# Patient Record
Sex: MALE | Race: WHITE | NOT HISPANIC OR LATINO | Employment: OTHER | URBAN - METROPOLITAN AREA
[De-identification: names, ages, dates, MRNs, and addresses within clinical notes are randomized per-mention and may not be internally consistent; named-entity substitution may affect disease eponyms.]

---

## 2017-01-11 ENCOUNTER — GENERIC CONVERSION - ENCOUNTER (OUTPATIENT)
Dept: OTHER | Facility: OTHER | Age: 42
End: 2017-01-11

## 2017-02-01 ENCOUNTER — ALLSCRIPTS OFFICE VISIT (OUTPATIENT)
Dept: OTHER | Facility: OTHER | Age: 42
End: 2017-02-01

## 2017-06-29 ENCOUNTER — ALLSCRIPTS OFFICE VISIT (OUTPATIENT)
Dept: OTHER | Facility: OTHER | Age: 42
End: 2017-06-29

## 2018-01-10 NOTE — RESULT NOTES
Verified Results  (Q) BILIRUBIN, FRACTIONATED [id number] 12:00AM Dima Ashton     Test Name Result Flag Reference   BILIRUBIN, TOTAL 2 0 mg/dL H 0 2-1 2   BILIRUBIN, DIRECT 0 4 mg/dL H < OR = 0 2   BILIRUBIN, INDIRECT 1 6 mg/dL (calc) H 0 2-1 2     (Q) LYME DISEASE AB, TOTAL W/REFL WB (IGG, IGM) [id number] 12:00AM Dima Ashton     Test Name Result Flag Reference   LYME AB SCREEN < OR = 0 90 index     Index                 Interpretation                     -----                 --------------                     < or = 0 90           Negative                     0  91-1 09             Equivocal                     > or = 1 10           Positive     The use of purified VlsE-1 and PepC10 antigens in this  assay provides improved specificity compared to assays  that utilize whole cell lysates of B  burgdorferi, the  causative agent of Lyme disease, and slightly better  sensitivity compared to the C6 antibody assay  As recommended by the Food and Drug   Administration (FDA), all samples with positive or   equivocal results in a Borrelia burgdorferi antibody    EIA (screening) will be tested using a blot method  Positive or equivocal screening test results should not   be interpreted as truly positive until verified as such   using a supplemental assay (e g , B  burgdorferi blot)  The screening test and/or blot for B  burgdorferi   antibodies may be falsely negative in early stages of   Lyme disease, including the period when erythema   migrans is apparent         Plan  Abdominal pain, epigastric, Hyperbilirubinemia    · US RIGHT UPPER QUADRANT; Status:Hold For - Scheduling; Requested  for:[id number];

## 2018-01-11 NOTE — RESULT NOTES
Verified Results  US RIGHT UPPER QUADRANT [id number] 07:21PM Idalmis Terrell Order Number: [id number]    - Patient Instructions: To schedule this appointment, please contact Central Scheduling at [phone]  Test Name Result Flag Reference   US RIGHT UPPER QUADRANT (Report)     RIGHT UPPER QUADRANT ULTRASOUND     INDICATION: Elevated bilirubin  Abdominal pain  COMPARISON: None  TECHNIQUE:  Real-time ultrasound of the right upper quadrant was performed with a curvilinear transducer with both volumetric sweeps and still imaging techniques  FINDINGS:   PANCREAS: Visualized portions of the pancreas are within normal limits  AORTA AND IVC: Visualized portions are normal for patient age  LIVER:   Size: Within normal range  The liver measures 16 7 cm in the midclavicular line  Contour: Surface contour is smooth  Parenchyma: Echogenicity and echotexture are within normal limits  No evidence of suspicious mass  Limited imaging of the main portal vein shows it to be patent and hepatopedal  Waveform is within normal limits  BILIARY:   The gallbladder is normal in caliber  No wall thickening or pericholecystic fluid  No stones or sludge identified  2 separate rounded echogenic Intraluminal echogenic lesions measuring 3 mm each  No sonographic Murray's sign  No intrahepatic biliary dilatation  CBD measures 2 mm  No choledocholithiasis  KIDNEY:    Right kidney measures 12 2 x 5 4 cm  Partial column of Yassine  Within normal limits  ASCITES:  None  IMPRESSION:   Unremarkable exam      Small gallbladder polyps  According to current literature guidelines (JACR 2013;10:953-956) small polyps this size are benign and no workup or followup is needed  Workstation performed: [id number]     Signed by:    Mandy Diaz DO   12/14/16

## 2018-01-12 VITALS
TEMPERATURE: 98.4 F | BODY MASS INDEX: 25.74 KG/M2 | DIASTOLIC BLOOD PRESSURE: 70 MMHG | HEIGHT: 75 IN | WEIGHT: 207 LBS | SYSTOLIC BLOOD PRESSURE: 100 MMHG

## 2018-01-15 VITALS
HEIGHT: 75 IN | DIASTOLIC BLOOD PRESSURE: 70 MMHG | TEMPERATURE: 96.9 F | BODY MASS INDEX: 27.6 KG/M2 | SYSTOLIC BLOOD PRESSURE: 111 MMHG | WEIGHT: 222 LBS

## 2018-01-17 NOTE — RESULT NOTES
Message   Please tell Preston Regalado that his upper GI was negative and ask how he is doing with the Nexium  Verified Results  * FL UGI W/ AIR ROUTINE 27Oct2016 07:58AM Debi Doyle Order Number: [id number]    - Patient Instructions: To schedule this appointment, please contact Central Scheduling at [phone]  Test Name Result Flag Reference   FL UGI W/ AIR WO  (Report)     UPPER GI SERIES DOUBLE CONTRAST     INDICATION: Epigastric pain  Some cramping for the last month  COMPARISON: None     IMAGES: 28     FLUOROSCOPY TIME: 1 4 minutes     TECHNIQUE: The patient was given effervescent granules and barium by mouth and images of the esophagus, stomach, and small bowel were obtained  FINDINGS:     The esophagus is normal in caliber  Esophageal motility is normal and emptying of contrast from the esophagus is prompt  There is no mucosal mass, ulceration or fold thickening identified  The stomach is unremarkable in size  The gastric mucosa is normal  No penetrating ulcers or masses  Contrast empties promptly into the duodenum  The duodenum is normal in caliber  The ligament of Treitz/duodenojejunal junction lies in a normal position  Gastroesophageal reflux was not observed  There is no hiatal hernia  IMPRESSION:     Unremarkable upper GI series  Workstation performed: [id number]     Signed by:    Baldemar Hunt MD   10/27/16   100   75

## 2018-01-18 NOTE — RESULT NOTES
Verified Results  (1) CBC/PLT/DIFF [id number] 12:00AM Dima Ashton     Test Name Result Flag Reference   WHITE BLOOD CELL COUNT 5 7 Thousand/uL  3 8-10 8   RED BLOOD CELL COUNT 5 05 Million/uL  4 20-5 80   HEMOGLOBIN 15 2 g/dL  13 2-17 1   HEMATOCRIT 46 6 %  38 5-50 0   MCV 92 3 fL  80 0-100 0   MCH 30 1 pg  27 0-33 0   MCHC 32 6 g/dL  32 0-36 0   RDW 12 6 %  11 0-15 0   PLATELET COUNT 857 Thousand/uL  140-400   MPV 10 4 fL  7 5-11 5   ABSOLUTE NEUTROPHILS 3312 cells/uL  3360-7907   ABSOLUTE LYMPHOCYTES 1961 cells/uL  850-3900   ABSOLUTE MONOCYTES 342 cells/uL  200-950   ABSOLUTE EOSINOPHILS 63 cells/uL     ABSOLUTE BASOPHILS 23 cells/uL  0-200   NEUTROPHILS 58 1 %     LYMPHOCYTES 34 4 %     MONOCYTES 6 0 %     EOSINOPHILS 1 1 %     BASOPHILS 0 4 %       (1) COMPREHENSIVE METABOLIC PANEL [id number] 43:25UW [facility]     Test Name Result Flag Reference   GLUCOSE 100 mg/dL H 65-99   Fasting reference interval   UREA NITROGEN (BUN) 22 mg/dL  7-25   CREATININE 1 04 mg/dL  0 60-1 35   eGFR NON-AFR   AMERICAN 90 mL/min/1 73m2  > OR = 60   eGFR AFRICAN AMERICAN 104 mL/min/1 73m2  > OR = 60   BUN/CREATININE RATIO   7-05   NOT APPLICABLE (calc)   SODIUM 140 mmol/L  135-146   POTASSIUM 4 4 mmol/L  3 5-5 3   CHLORIDE 105 mmol/L     CARBON DIOXIDE 27 mmol/L  20-31   CALCIUM 9 5 mg/dL  8 6-10 3   PROTEIN, TOTAL 7 0 g/dL  6 1-8 1   ALBUMIN 4 9 g/dL  3 6-5 1   GLOBULIN 2 1 g/dL (calc)  1 9-3 7   ALBUMIN/GLOBULIN RATIO 2 3 (calc)  1 0-2 5   BILIRUBIN, TOTAL 1 5 mg/dL H 0 2-1 2   ALKALINE PHOSPHATASE 57 U/L     AST 19 U/L  10-40   ALT 21 U/L  9-46     (Q) TSH, 3RD GENERATION [id number] 12:00AM [facility]     Test Name Result Flag Reference   TSH 0 87 mIU/L  0 40-4 50

## 2018-02-21 ENCOUNTER — OFFICE VISIT (OUTPATIENT)
Dept: FAMILY MEDICINE CLINIC | Facility: CLINIC | Age: 43
End: 2018-02-21
Payer: COMMERCIAL

## 2018-02-21 VITALS
WEIGHT: 229 LBS | TEMPERATURE: 97.1 F | DIASTOLIC BLOOD PRESSURE: 62 MMHG | BODY MASS INDEX: 28.62 KG/M2 | SYSTOLIC BLOOD PRESSURE: 128 MMHG

## 2018-02-21 DIAGNOSIS — F41.9 ANXIETY: Primary | ICD-10-CM

## 2018-02-21 DIAGNOSIS — H10.9 CONJUNCTIVITIS OF LEFT EYE, UNSPECIFIED CONJUNCTIVITIS TYPE: ICD-10-CM

## 2018-02-21 PROCEDURE — 99214 OFFICE O/P EST MOD 30 MIN: CPT | Performed by: FAMILY MEDICINE

## 2018-02-21 RX ORDER — SULFACETAMIDE SODIUM 100 MG/ML
2 SOLUTION/ DROPS OPHTHALMIC
Qty: 15 ML | Refills: 0 | Status: SHIPPED | OUTPATIENT
Start: 2018-02-21 | End: 2018-11-30 | Stop reason: ALTCHOICE

## 2018-02-21 RX ORDER — FLUOXETINE HYDROCHLORIDE 20 MG/1
20 CAPSULE ORAL DAILY
Qty: 90 CAPSULE | Refills: 1 | Status: SHIPPED | OUTPATIENT
Start: 2018-02-21 | End: 2018-08-24 | Stop reason: SDUPTHER

## 2018-02-21 RX ORDER — FLUOXETINE HYDROCHLORIDE 20 MG/1
20 CAPSULE ORAL DAILY
Refills: 0 | COMMUNITY
Start: 2018-01-23 | End: 2018-02-21 | Stop reason: SDUPTHER

## 2018-02-21 NOTE — PROGRESS NOTES
Assessment/Plan:  Anxiety  The patient has anxiety is well controlled on fluoxetine 20 mg daily which he will continue  Conjunctivitis of left eye  The patient presents with what appears to be in acute conjunctivitis on the left  He is going to use Bleph-10 drops q 4 hours while awake  He is asked to call in 4872 hours of his symptoms have not resolved  He will call sooner should he develop any worsening of his condition  He agrees  Will also use warm compresses  Diagnoses and all orders for this visit:    Anxiety  -     FLUoxetine (PROzac) 20 mg capsule; Take 1 capsule (20 mg total) by mouth daily    Conjunctivitis of left eye, unspecified conjunctivitis type  -     sulfacetamide (BLEPH-10) 10 % ophthalmic solution; Administer 2 drops into the left eye 6 (six) times a day    Other orders  -     Discontinue: FLUoxetine (PROzac) 20 mg capsule; Take 20 mg by mouth daily          Subjective:   Chief Complaint   Patient presents with    Conjunctivitis     sx started yesterday weeping but not crusty    Medication Refill   Watery red eye thay is uncomfortable since last night  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  Mood OK and anxiety level is good  Sleep good, interest, energy is so so  Concentration diminished, appetite good, No suicidal ideation  Patient ID: Danita Tom is a 38 y o  male  HPI   Patient is a 19-year-old male who presents today for follow-up of chronic anxiety  He has been compliant with his fluoxetine and states that this is very effective in controlling his anxiety level  He states his mood is okay  He sleeps well  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  States that his energy is so-so  He does have some difficulty with concentration at times  He states his appetite is good and he has no suicidal ideation  A 2nd complaint today is that he started last evening with irritation of his left eye    This morning he noted that it was significantly red and weeping  It has been itchy and uncomfortable  He has had no visual disturbance  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  The following portions of the patient's history were reviewed and updated as appropriate: allergies, current medications, past family history, past medical history, past social history, past surgical history and problem list     ROS    As noted in HPI  Objective:    Physical Exam   Constitutional: He is oriented to person, place, and time  He appears well-developed and well-nourished  HENT:   Mouth/Throat: Oropharynx is clear and moist  No oropharyngeal exudate  Eyes: Pupils are equal, round, and reactive to light  Right eye exhibits no discharge  Left eye exhibits discharge  No scleral icterus  He has some thin yellow drainage from the left conjunctiva  Conjunctiva is diffusely erythematous bulbar greater than palpebral   No evidence of blepharitis presently  Neck: Normal range of motion  Neck supple  No thyromegaly present  No preauricular node present   Cardiovascular: Normal rate and normal heart sounds  Pulmonary/Chest: Effort normal and breath sounds normal  No respiratory distress  He has no rales  Lymphadenopathy:     He has no cervical adenopathy  Neurological: He is alert and oriented to person, place, and time  Psychiatric: He has a normal mood and affect

## 2018-02-21 NOTE — ASSESSMENT & PLAN NOTE
The patient presents with what appears to be in acute conjunctivitis on the left  He is going to use Bleph-10 drops q 4 hours while awake  He is asked to call in 4872 hours of his symptoms have not resolved  He will call sooner should he develop any worsening of his condition  He agrees  Will also use warm compresses

## 2018-02-21 NOTE — PATIENT INSTRUCTIONS
He is going to continue his fluoxetine and will see him back in 6 months or sooner as needed  He is going to use warm compresses and Bleph-10 drops for his left-sided conjunctivitis  We did not stain is eye with fluorescein today as history was not consistent with injury  We did tell him that if his symptoms have not significantly improved over the next 48 hours he should call for re-evaluation  He agrees

## 2018-08-24 DIAGNOSIS — F41.9 ANXIETY: ICD-10-CM

## 2018-08-24 RX ORDER — FLUOXETINE HYDROCHLORIDE 20 MG/1
20 CAPSULE ORAL DAILY
Qty: 90 CAPSULE | Refills: 0 | Status: SHIPPED | OUTPATIENT
Start: 2018-08-24 | End: 2018-11-21 | Stop reason: SDUPTHER

## 2018-11-21 DIAGNOSIS — F41.9 ANXIETY: ICD-10-CM

## 2018-11-23 RX ORDER — FLUOXETINE HYDROCHLORIDE 20 MG/1
20 CAPSULE ORAL DAILY
Qty: 30 CAPSULE | Refills: 0 | OUTPATIENT
Start: 2018-11-23 | End: 2018-11-30 | Stop reason: SDUPTHER

## 2018-11-30 ENCOUNTER — OFFICE VISIT (OUTPATIENT)
Dept: FAMILY MEDICINE CLINIC | Facility: CLINIC | Age: 43
End: 2018-11-30
Payer: COMMERCIAL

## 2018-11-30 VITALS
BODY MASS INDEX: 29 KG/M2 | OXYGEN SATURATION: 97 % | DIASTOLIC BLOOD PRESSURE: 70 MMHG | WEIGHT: 232 LBS | TEMPERATURE: 97.8 F | SYSTOLIC BLOOD PRESSURE: 116 MMHG | HEART RATE: 63 BPM

## 2018-11-30 DIAGNOSIS — M75.82 TENDINITIS OF LEFT ROTATOR CUFF: ICD-10-CM

## 2018-11-30 DIAGNOSIS — F41.9 ANXIETY: Primary | ICD-10-CM

## 2018-11-30 PROBLEM — H10.9 CONJUNCTIVITIS OF LEFT EYE: Status: RESOLVED | Noted: 2018-02-21 | Resolved: 2018-11-30

## 2018-11-30 PROCEDURE — 99214 OFFICE O/P EST MOD 30 MIN: CPT | Performed by: FAMILY MEDICINE

## 2018-11-30 PROCEDURE — 1036F TOBACCO NON-USER: CPT | Performed by: FAMILY MEDICINE

## 2018-11-30 RX ORDER — FLUOXETINE HYDROCHLORIDE 20 MG/1
20 CAPSULE ORAL DAILY
Qty: 90 CAPSULE | Refills: 1 | OUTPATIENT
Start: 2018-11-30 | End: 2018-12-22 | Stop reason: SDUPTHER

## 2018-11-30 RX ORDER — NAPROXEN 500 MG/1
500 TABLET ORAL 2 TIMES DAILY WITH MEALS
Qty: 60 TABLET | Refills: 2 | Status: SHIPPED | OUTPATIENT
Start: 2018-11-30 | End: 2019-07-27 | Stop reason: ALTCHOICE

## 2018-11-30 NOTE — ASSESSMENT & PLAN NOTE
He is going to use ice as well as naproxen  Will try to limit use as best possible  If he is not seeing improvement next 2-3 weeks we discussed referral either to physical therapy or orthopedic surgery  Will call let us know with his progress

## 2018-11-30 NOTE — PROGRESS NOTES
Assessment/Plan:  Anxiety  His anxiety is well controlled with fluoxetine  He will continue with same  Tendinitis of left rotator cuff  He is going to use ice as well as naproxen  Will try to limit use as best possible  If he is not seeing improvement next 2-3 weeks we discussed referral either to physical therapy or orthopedic surgery  Will call let us know with his progress  Diagnoses and all orders for this visit:    Anxiety  -     FLUoxetine (PROzac) 20 mg capsule; Take 1 capsule (20 mg total) by mouth daily for 30 days    Tendinitis of left rotator cuff  -     naproxen (NAPROSYN) 500 mg tablet; Take 1 tablet (500 mg total) by mouth 2 (two) times a day with meals          Subjective:   Chief Complaint   Patient presents with    Anxiety     here for refills on his med today  pt declined the flu shot  He has been c/w fluoxetine  Patient ID: Mickie Shoemaker is a 41 y o  male  HPI  Patient is a 82-year-old male presents today for follow-up of chronic anxiety for which he takes fluoxetine  He states that it works well for his anxiety  He has no complaint with it other than some myalgias which he is not sure related  He has significant left shoulder pain which is not exertional   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  When he rolls over on it at night also causes increased discomfort  He has no definitive weakness  No shortness of breath  No nausea vomiting GI  complaint  He sleeps well when he is not having shoulder pain, interest level is good energy is good concentration is okay his appetite is normal   He has no suicidal ideation    The following portions of the patient's history were reviewed and updated as appropriate: allergies, current medications, past family history, past medical history, past social history, past surgical history and problem list     Review of Systems   Constitution: Negative for chills, decreased appetite, fever, malaise/fatigue, weight gain and weight loss  Cardiovascular: Negative for chest pain, irregular heartbeat and leg swelling  Respiratory: Negative for cough, shortness of breath and sputum production  Endocrine: Negative for polydipsia, polyphagia and polyuria  Skin: Negative for rash  Musculoskeletal: Positive for joint pain and myalgias  L shoulder pain nocturnal as well  Gastrointestinal: Negative for constipation and diarrhea  Genitourinary: Negative for bladder incontinence, frequency and urgency  Neurological: Positive for headaches  Psychiatric/Behavioral: Negative for depression and suicidal ideas  The patient is nervous/anxious  Objective:    Physical Exam   Constitutional: He is oriented to person, place, and time  He appears well-developed and well-nourished  Neck: Neck supple  No JVD present  No thyromegaly present  Cardiovascular: Normal rate, regular rhythm and normal heart sounds  Exam reveals no gallop  No murmur heard  Pulmonary/Chest: Effort normal and breath sounds normal  No respiratory distress  He has no wheezes  He has no rales  Musculoskeletal: He exhibits tenderness  He exhibits no edema or deformity  There is fairly significant limitation of internal rotation with the left arm  This produces increased discomfort  Resisted supraspinatus action also produces significant discomfort and there is mild weakness though there is no atrophy  Flexion extension appear normal   Again there is limitation of a be duction with discomfort and some weakness  Again there is no atrophy about the shoulder with normal tone as well  Neurological: He is alert and oriented to person, place, and time  Skin: No erythema  Psychiatric: He has a normal mood and affect   Thought content normal

## 2018-12-22 DIAGNOSIS — F41.9 ANXIETY: ICD-10-CM

## 2018-12-24 RX ORDER — FLUOXETINE HYDROCHLORIDE 20 MG/1
CAPSULE ORAL
Qty: 30 CAPSULE | Refills: 0 | Status: SHIPPED | OUTPATIENT
Start: 2018-12-24 | End: 2019-01-22 | Stop reason: SDUPTHER

## 2019-01-22 DIAGNOSIS — F41.9 ANXIETY: ICD-10-CM

## 2019-01-22 RX ORDER — FLUOXETINE HYDROCHLORIDE 20 MG/1
20 CAPSULE ORAL DAILY
Qty: 90 CAPSULE | Refills: 0 | Status: SHIPPED | OUTPATIENT
Start: 2019-01-22 | End: 2019-04-18 | Stop reason: SDUPTHER

## 2019-04-18 DIAGNOSIS — F41.9 ANXIETY: ICD-10-CM

## 2019-04-18 RX ORDER — FLUOXETINE HYDROCHLORIDE 20 MG/1
20 CAPSULE ORAL DAILY
Qty: 90 CAPSULE | Refills: 0 | Status: SHIPPED | OUTPATIENT
Start: 2019-04-18 | End: 2019-07-16 | Stop reason: SDUPTHER

## 2019-07-16 DIAGNOSIS — F41.9 ANXIETY: ICD-10-CM

## 2019-07-16 RX ORDER — FLUOXETINE HYDROCHLORIDE 20 MG/1
20 CAPSULE ORAL DAILY
Qty: 30 CAPSULE | Refills: 0 | Status: SHIPPED | OUTPATIENT
Start: 2019-07-16 | End: 2019-07-27 | Stop reason: SDUPTHER

## 2019-07-27 ENCOUNTER — OFFICE VISIT (OUTPATIENT)
Dept: FAMILY MEDICINE CLINIC | Facility: CLINIC | Age: 44
End: 2019-07-27
Payer: COMMERCIAL

## 2019-07-27 VITALS
DIASTOLIC BLOOD PRESSURE: 72 MMHG | WEIGHT: 229 LBS | HEIGHT: 76 IN | BODY MASS INDEX: 27.89 KG/M2 | SYSTOLIC BLOOD PRESSURE: 126 MMHG

## 2019-07-27 DIAGNOSIS — M54.16 LUMBAR RADICULITIS: ICD-10-CM

## 2019-07-27 DIAGNOSIS — F41.9 ANXIETY: Primary | ICD-10-CM

## 2019-07-27 DIAGNOSIS — M25.512 CHRONIC LEFT SHOULDER PAIN: ICD-10-CM

## 2019-07-27 DIAGNOSIS — G89.29 CHRONIC LEFT SHOULDER PAIN: ICD-10-CM

## 2019-07-27 PROCEDURE — 1036F TOBACCO NON-USER: CPT | Performed by: FAMILY MEDICINE

## 2019-07-27 PROCEDURE — 99214 OFFICE O/P EST MOD 30 MIN: CPT | Performed by: FAMILY MEDICINE

## 2019-07-27 PROCEDURE — 3008F BODY MASS INDEX DOCD: CPT | Performed by: FAMILY MEDICINE

## 2019-07-27 RX ORDER — FLUOXETINE HYDROCHLORIDE 20 MG/1
20 CAPSULE ORAL DAILY
Qty: 90 CAPSULE | Refills: 1 | Status: SHIPPED | OUTPATIENT
Start: 2019-07-27 | End: 2020-02-19 | Stop reason: SDUPTHER

## 2019-07-27 NOTE — PROGRESS NOTES
Assessment/Plan:  Anxiety  We are going to refills fluoxetine which appears effective at alleviating his chronic anxiety  Chronic left shoulder pain  Going to send in for x-rays of his left shoulder  He may require referral to Orthopedic surgery due to his chronic pain  Lumbar radiculitis  Chronic pain of his lumbar spine is worsening with some radicular symptoms  We are going to have him go for x-rays of the lumbar spine  Will follow up with him when results are available  Naprosyn has been ineffective  Will consider physical therapy versus referral to Orthopedic surgery/pain management for further evaluation  Diagnoses and all orders for this visit:    Anxiety  -     FLUoxetine (PROzac) 20 mg capsule; Take 1 capsule (20 mg total) by mouth daily    Chronic left shoulder pain  -     XR shoulder 2+ vw left; Future    Lumbar radiculitis  -     XR spine lumbar 2 or 3 views injury; Future          Subjective:   Chief Complaint   Patient presents with    Follow-up     Med Check   I still have shoulder and back issues  L shoulder and low back still with stiffness and pain  R LE radicular sx  No incontinence but some leg weakness  Paresthesias  L shoulder pain with use above head  Works as a contractor  No specific injury  Interest good, energy +/-, concentration OK, anxiety level good  Patient ID: Carl Harrell is a 41 y o  male  HPI  The patient is a 61-year-old male presents today for follow-up of chronic anxiety  He has been compliant with fluoxetine 20 mg and states that it is very effective at alleviating his anxiety  He has no side effects from the medication  He sleeps well except for when his shoulder and back bother him  His interest level is good  He continues to  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   His energy level is good as well as concentration  His anxiety level is much reduced  Does complain about longstanding back and left shoulder pain    With questioning he does have some right lower extremity radicular symptoms  He has some paresthesias and he does feel that his leg is weak at times  He has had no incontinence of bowel or bladder  He had no specific injury but he does work as a   The following portions of the patient's history were reviewed and updated as appropriate: allergies, current medications, past medical history, past social history, past surgical history and problem list     Review of Systems   Constitution: Negative  HENT: Negative  Cardiovascular: Negative  Respiratory: Negative  Endocrine: Negative  Hematologic/Lymphatic: Negative  Musculoskeletal: Positive for back pain, joint pain, muscle weakness and stiffness  Gastrointestinal: Negative  Neurological: Negative  Psychiatric/Behavioral: Negative for suicidal ideas  The patient is nervous/anxious  The patient does not have insomnia  Objective:    Physical Exam   Constitutional: He is oriented to person, place, and time  He appears well-developed and well-nourished  Cardiovascular: Normal rate, regular rhythm and normal heart sounds  Pulmonary/Chest: Effort normal and breath sounds normal  No respiratory distress  He has no wheezes  He has no rales  Musculoskeletal: He exhibits tenderness  He exhibits no edema or deformity  He has tenderness about his left AC joint as well as anterior and superior rotator cuff  He has increased pain with abduction past 90°  Internal rotation is somewhat limited  Flexion appears normal   Resisted supraspinatus action produces some minimal discomfort  Impingement sign is positive  There is some paralumbar tenderness on the right  There is no step-off or deformity of the spine  No SI tenderness and no sacral notch tenderness  There is a positive straight leg raise on the right  Neurological: He is alert and oriented to person, place, and time  Psychiatric: He has a normal mood and affect   Thought content normal    Nursing note and vitals reviewed

## 2019-08-10 ENCOUNTER — HOSPITAL ENCOUNTER (OUTPATIENT)
Dept: RADIOLOGY | Facility: HOSPITAL | Age: 44
Discharge: HOME | End: 2019-08-10
Payer: COMMERCIAL

## 2019-08-10 DIAGNOSIS — G89.29 CHRONIC LEFT SHOULDER PAIN: ICD-10-CM

## 2019-08-10 DIAGNOSIS — M25.512 CHRONIC LEFT SHOULDER PAIN: ICD-10-CM

## 2019-08-10 DIAGNOSIS — M54.16 LUMBAR RADICULITIS: ICD-10-CM

## 2019-08-10 PROCEDURE — 73030 X-RAY EXAM OF SHOULDER: CPT

## 2019-08-10 PROCEDURE — 72100 X-RAY EXAM L-S SPINE 2/3 VWS: CPT

## 2019-08-17 DIAGNOSIS — M19.012 ARTHRITIS OF LEFT ACROMIOCLAVICULAR JOINT: Primary | ICD-10-CM

## 2019-08-17 DIAGNOSIS — M54.16 LUMBAR RADICULITIS: ICD-10-CM

## 2019-08-17 NOTE — PROGRESS NOTES
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

## 2019-08-30 VITALS
HEART RATE: 65 BPM | DIASTOLIC BLOOD PRESSURE: 82 MMHG | SYSTOLIC BLOOD PRESSURE: 142 MMHG | WEIGHT: 235.8 LBS | BODY MASS INDEX: 28.71 KG/M2 | HEIGHT: 76 IN

## 2019-08-30 DIAGNOSIS — M67.814 BICEPS TENDINOSIS OF LEFT SHOULDER: Primary | ICD-10-CM

## 2019-08-30 DIAGNOSIS — M19.012 ARTHRITIS OF LEFT ACROMIOCLAVICULAR JOINT: ICD-10-CM

## 2019-08-30 PROCEDURE — 99243 OFF/OP CNSLTJ NEW/EST LOW 30: CPT | Performed by: ORTHOPAEDIC SURGERY

## 2019-10-02 ENCOUNTER — CONSULT (OUTPATIENT)
Dept: PAIN MEDICINE | Facility: CLINIC | Age: 44
End: 2019-10-02
Payer: COMMERCIAL

## 2019-10-02 ENCOUNTER — TRANSCRIBE ORDERS (OUTPATIENT)
Dept: PAIN MEDICINE | Facility: CLINIC | Age: 44
End: 2019-10-02

## 2019-10-02 VITALS
DIASTOLIC BLOOD PRESSURE: 78 MMHG | TEMPERATURE: 98.7 F | HEART RATE: 70 BPM | SYSTOLIC BLOOD PRESSURE: 132 MMHG | BODY MASS INDEX: 28.24 KG/M2 | WEIGHT: 232 LBS

## 2019-10-02 DIAGNOSIS — M51.16 INTERVERTEBRAL DISC DISORDER WITH RADICULOPATHY OF LUMBAR REGION: ICD-10-CM

## 2019-10-02 PROCEDURE — 99244 OFF/OP CNSLTJ NEW/EST MOD 40: CPT | Performed by: ANESTHESIOLOGY

## 2019-10-02 RX ORDER — METHYLPREDNISOLONE 4 MG/1
TABLET ORAL
Qty: 21 TABLET | Refills: 0 | Status: SHIPPED | OUTPATIENT
Start: 2019-10-02 | End: 2019-11-14 | Stop reason: ALTCHOICE

## 2019-10-02 NOTE — PROGRESS NOTES
Assessment  1  Intervertebral disc disorder with radiculopathy of lumbar region        Plan  The patient's symptoms, history/physical are consistent with pain that is multifactorial in origin but predominantly the result of a lumbar radiculopathy  At this time, given his weakness and ongoing pain symptoms despite oral analgesics, I will order an MRI of the lumbar spine to evaluate  I advised him I will call with the results and discuss treatment moving forward  For now, I will place him on a Medrol Dosepak to help with swelling inflammation  He was advised to take with a meal to decrease stomach irritation  My impressions and treatment recommendations were discussed in detail with the patient who verbalized understanding and had no further questions  Discharge instructions were provided  I personally saw and examined the patient and I agree with the above discussed plan of care  Orders Placed This Encounter   Procedures    MRI lumbar spine without contrast     Standing Status:   Future     Standing Expiration Date:   10/2/2023     Scheduling Instructions: There is no preparation for this test  Please leave your jewelry and valuables at home, wedding rings are the exception  Please bring your insurance cards, a form of photo ID and a list of your medications with you  Arrive 15 minutes prior to your appointment time in order to register  Please bring any prior CT or MRI studies of this area that were not performed at a [facility] facility  To schedule this appointment, please contact Central Scheduling at [phone]  Order Specific Question:   What is the patient's sedation requirement?      Answer:   No Sedation     New Medications Ordered This Visit   Medications    methylPREDNISolone 4 MG tablet therapy pack     Sig: Use as directed on package     Dispense:  21 tablet     Refill:  0       History of Present Illness    Alannah Julien is a 45 y o  male who presents for consultation in regards to lower back pain and left-sided leg pain  Symptoms have been present for 2 years without any precipitating injury or trauma  Pain is moderate to severe rated 7-8/10 on a numeric rating scale and felt nearly constantly  Symptoms are worst in the morning and nighttime  Pain is described to be sharp, shooting and cramping in the lower back and radiates into the left leg posteriorly  He feels weakness of the left leg  Symptoms are aggravated with standing, bending, sitting, walking, coughing, sneezing, exercise  There is no change with relaxation or bowel movements  Treatment history has included heat/ice which provides no relief  He has tried over-the-counter analgesics including ibuprofen and Tylenol with minimal relief  He does daily stretching which used to help but is no longer helping  I have personally reviewed and/or updated the patient's past medical history, past surgical history, family history, social history, current medications, allergies, and vital signs today  Review of Systems   Constitutional: Negative for fever and unexpected weight change  HENT: Negative for trouble swallowing  Eyes: Negative for visual disturbance  Respiratory: Negative for shortness of breath and wheezing  Cardiovascular: Negative for chest pain and palpitations  Gastrointestinal: Negative for constipation, diarrhea, nausea and vomiting  Endocrine: Negative for cold intolerance, heat intolerance and polydipsia  Genitourinary: Negative for difficulty urinating and frequency  Musculoskeletal: Negative for arthralgias, gait problem, joint swelling and myalgias  Skin: Negative for rash  Neurological: Negative for dizziness, seizures, syncope, weakness and headaches  Hematological: Does not bruise/bleed easily  Psychiatric/Behavioral: Negative for dysphoric mood  All other systems reviewed and are negative        Patient Active Problem List   Diagnosis    Anxiety  Gilbert's syndrome    Tendinitis of left rotator cuff    Arthritis of left acromioclavicular joint    Lumbar radiculitis       Past Medical History:   Diagnosis Date    Anxiety     Conjunctivitis of left eye 2/21/2018       Past Surgical History:   Procedure Laterality Date    TONSILLECTOMY         Family History   Problem Relation Age of Onset    Hypertension Mother     Multiple sclerosis Father     Substance Abuse Brother     Lung cancer Maternal Grandmother     Hypertension Maternal Grandmother     Lung cancer Maternal Grandfather     COPD Maternal Grandfather     No Known Problems Brother     No Known Problems Sister     No Known Problems Maternal Aunt     No Known Problems Maternal Uncle     No Known Problems Paternal Aunt     No Known Problems Paternal Uncle     No Known Problems Paternal Grandmother        Social History     Occupational History    Not on file   Tobacco Use    Smoking status: Never Smoker    Smokeless tobacco: Current User   Substance and Sexual Activity    Alcohol use: No    Drug use: No    Sexual activity: Not on file       Current Outpatient Medications on File Prior to Visit   Medication Sig    FLUoxetine (PROzac) 20 mg capsule Take 1 capsule (20 mg total) by mouth daily     No current facility-administered medications on file prior to visit  No Known Allergies    Physical Exam    /78   Pulse 70   Temp 98 7 °F (37 1 °C) (Oral)   Wt 105 kg (232 lb)   BMI 28 24 kg/m²     Constitutional: normal, well developed, well nourished, alert, in no distress and non-toxic and no overt pain behavior    Eyes: anicteric  HEENT: grossly intact  Neck: supple, symmetric, trachea midline and no masses   Pulmonary:even and unlabored  Cardiovascular:No edema or pitting edema present  Skin:Normal without rashes or lesions and well hydrated  Psychiatric:Mood and affect appropriate  Neurologic:Cranial Nerves II-XII grossly intact  Musculoskeletal:normal     Lumbar Spine Exam  Appearance:  Normal lordosis  Palpation/Tenderness:  left lumbar paraspinal tenderness  right lumbar paraspinal tenderness  Sensory:  no sensory deficits noted  Range of Motion:  Flexion: Moderately limited  with pain  Extension:  Minimally limited  with pain  Lateral Flexion - Left:  Moderately limited  with pain  Lateral Flexion - Right:  No limitation  without pain  Rotation - Left:  Moderately limited  with pain  Rotation - Right:  No limitation  without pain  Motor Strength:  Left hip flexion:  5/5  Left hip extension:  5/5  Right hip flexion:  5/5  Right hip extension:  5/5  Left knee flexion:  5/5  Left knee extension:  5/5  Right knee flexion:  5/5  Right knee extension:  5/5  Left foot dorsiflexion:  4/5  Left foot plantar flexion:  5/5  Right foot dorsiflexion:  5/5  Right foot plantar flexion:  5/5  Reflexes:  Left Patellar:  2+   Right Patellar:  2+   Left Achilles:  2+   Right Achilles:  2+   Special Tests:  Left Straight Leg Test:  positive  Right Straight Leg Test:  negative  Left Ez's Maneuver:  negative  Right Ez's Maneuver:  negative      Imaging    XR LUMBAR SPINE (8/10/2019)     INDICATION:   M54 16: Radiculopathy, lumbar region      COMPARISON:  None     VIEWS:  XR SPINE LUMBAR 2 OR 3 VIEWS INJURY        FINDINGS:     There is no evidence of acute fracture or destructive osseous lesion      Alignment is unremarkable      Moderate endplate and facet joint degenerative change at L5-S1      The pedicles appear intact      Soft tissues are unremarkable      IMPRESSION:     No acute osseous abnormality        Degenerative changes as described  XR LEFT SHOULDER(8/10/2019)     INDICATION:   M25 512: Pain in left shoulder  G89 29:  Other chronic pain      COMPARISON:  None     VIEWS:  XR SHOULDER 2+ VW LEFT         FINDINGS:     There is no acute fracture or dislocation      Mild osteoarthritis acromioclavicular joint      No lytic or blastic lesions are seen      Soft tissues are unremarkable      IMPRESSION:     No acute osseous abnormality      Degenerative changes as described

## 2019-10-14 ENCOUNTER — HOSPITAL ENCOUNTER (OUTPATIENT)
Dept: MRI IMAGING | Facility: HOSPITAL | Age: 44
Discharge: HOME | End: 2019-10-14
Attending: ANESTHESIOLOGY
Payer: COMMERCIAL

## 2019-10-14 DIAGNOSIS — M51.16 INTERVERTEBRAL DISC DISORDER WITH RADICULOPATHY OF LUMBAR REGION: ICD-10-CM

## 2019-10-14 PROCEDURE — 72148 MRI LUMBAR SPINE W/O DYE: CPT

## 2019-10-18 ENCOUNTER — TELEPHONE (OUTPATIENT)
Dept: PAIN MEDICINE | Facility: CLINIC | Age: 44
End: 2019-10-18

## 2019-10-18 DIAGNOSIS — M51.16 INTERVERTEBRAL DISC DISORDER WITH RADICULOPATHY OF LUMBAR REGION: Primary | ICD-10-CM

## 2019-10-18 NOTE — TELEPHONE ENCOUNTER
Left voicemail to go over MRI L-spine results which shows disc bulging at L4-5 and L5-S1  If still symptomatic, would recommend proceeding with left L4 and left L5 transforaminal epidural steroid injection

## 2019-10-21 NOTE — TELEPHONE ENCOUNTER
I informed pt of the lumbar spine MRI results and FQ's rec as stated in task  Pt is interested in proceeding with the injection   I told pt I would make FQ aware and he will have his  call pt to set up the inj

## 2019-11-14 ENCOUNTER — HOSPITAL ENCOUNTER (OUTPATIENT)
Dept: RADIOLOGY | Facility: CLINIC | Age: 44
Discharge: HOME | End: 2019-11-14
Attending: ANESTHESIOLOGY
Payer: COMMERCIAL

## 2019-11-14 VITALS
OXYGEN SATURATION: 95 % | RESPIRATION RATE: 20 BRPM | TEMPERATURE: 98.1 F | HEART RATE: 69 BPM | SYSTOLIC BLOOD PRESSURE: 144 MMHG | DIASTOLIC BLOOD PRESSURE: 82 MMHG

## 2019-11-14 DIAGNOSIS — M51.16 INTERVERTEBRAL DISC DISORDER WITH RADICULOPATHY OF LUMBAR REGION: ICD-10-CM

## 2019-11-14 PROCEDURE — 64484 NJX AA&/STRD TFRM EPI L/S EA: CPT | Performed by: ANESTHESIOLOGY

## 2019-11-14 PROCEDURE — 64483 NJX AA&/STRD TFRM EPI L/S 1: CPT | Performed by: ANESTHESIOLOGY

## 2019-11-14 RX ORDER — METHYLPREDNISOLONE ACETATE 80 MG/ML
80 INJECTION, SUSPENSION INTRA-ARTICULAR; INTRALESIONAL; INTRAMUSCULAR; PARENTERAL; SOFT TISSUE ONCE
Status: COMPLETED | OUTPATIENT
Start: 2019-11-14 | End: 2019-11-14

## 2019-11-14 RX ORDER — BUPIVACAINE HCL/PF 2.5 MG/ML
10 VIAL (ML) INJECTION ONCE
Status: COMPLETED | OUTPATIENT
Start: 2019-11-14 | End: 2019-11-14

## 2019-11-14 RX ORDER — 0.9 % SODIUM CHLORIDE 0.9 %
10 VIAL (ML) INJECTION ONCE
Status: COMPLETED | OUTPATIENT
Start: 2019-11-14 | End: 2019-11-14

## 2019-11-14 RX ADMIN — IOHEXOL 1 ML: 300 INJECTION, SOLUTION INTRAVENOUS at 15:54

## 2019-11-14 RX ADMIN — Medication 4 ML: at 15:51

## 2019-11-14 RX ADMIN — SODIUM CHLORIDE 4 ML: 9 INJECTION, SOLUTION INTRAMUSCULAR; INTRAVENOUS; SUBCUTANEOUS at 15:51

## 2019-11-14 RX ADMIN — METHYLPREDNISOLONE ACETATE 80 MG: 80 INJECTION, SUSPENSION INTRA-ARTICULAR; INTRALESIONAL; INTRAMUSCULAR; PARENTERAL; SOFT TISSUE at 15:54

## 2019-11-14 RX ADMIN — BUPIVACAINE HYDROCHLORIDE 2 ML: 2.5 INJECTION, SOLUTION EPIDURAL; INFILTRATION; INTRACAUDAL at 15:54

## 2019-11-14 NOTE — DISCHARGE INSTR - LAB
Epidural Steroid Injection   WHAT YOU NEED TO KNOW:   An epidural steroid injection (PONCHO) is a procedure to inject steroid medicine into the epidural space  The epidural space is between your spinal cord and vertebrae  Steroids reduce inflammation and fluid buildup in your spine that may be causing pain  You may be given pain medicine along with the steroids  ACTIVITY  · Do not drive or operate machinery today  · No strenuous activity today - bending, lifting, etc   · You may resume normal activites starting tomorrow - start slowly and as tolerated  · You may shower today, but no tub baths or hot tubs  · You may have numbness for several hours from the local anesthetic  Please use caution and common sense, especially with weight-bearing activities  CARE OF THE INJECTION SITE  · If you have soreness or pain, apply ice to the area today (20 minutes on/20 minutes off)  · Starting tomorrow, you may use warm, moist heat or ice if needed  · You may have an increase or change in your discomfort for 36-48 hours after your treatment  · Apply ice and continue with any pain medication you have been prescribed  · [facility]ify [facility] if you have any of the following: redness, drainage, swelling, headache, stiff neck or fever above 100°F     SPECIAL INSTRUCTIONS  · Our office will contact you in approximately 7 days for a progress report  MEDICATIONS  · Continue to take all routine medications  · Our office may have instructed you to hold some medications  If you have a problem specifically related to your procedure, please call our office at [phone]  Problems not related to your procedure should be directed to your primary care physician

## 2019-11-14 NOTE — H&P
History of Present Illness: The patient is a 45 y o  male who presents with complaints of left lower back and leg pain secondary to lumbar bulging disc and is here today for left L4 and left L5 transforaminal epidural steroid injection  Patient Active Problem List   Diagnosis    Anxiety    Gilbert's syndrome    Tendinitis of left rotator cuff    Arthritis of left acromioclavicular joint    Lumbar radiculitis       Past Medical History:   Diagnosis Date    Anxiety     Conjunctivitis of left eye 2/21/2018       Past Surgical History:   Procedure Laterality Date    TONSILLECTOMY           Current Outpatient Medications:     FLUoxetine (PROzac) 20 mg capsule, Take 1 capsule (20 mg total) by mouth daily, Disp: 90 capsule, Rfl: 1    Current Facility-Administered Medications:     bupivacaine (PF) (MARCAINE) 0 25 % injection 10 mL, 10 mL, Epidural, Once, Jamarcus Wilkerson MD    iohexol (OMNIPAQUE) 300 mg/mL injection 50 mL, 50 mL, Epidural, Once, Jamarcus Wilkerson MD    lidocaine (PF) (XYLOCAINE-MPF) 2 % injection 5 mL, 5 mL, Infiltration, Once, Jamarcus Wilkerson MD    methylPREDNISolone acetate (DEPO-MEDROL) injection 80 mg, 80 mg, Epidural, Once, Jamarcus Wilkerson MD    sodium chloride (PF) 0 9 % injection 10 mL, 10 mL, Infiltration, Once, Jamarcus Wilkerson MD    No Known Allergies    Physical Exam:   Vitals:    11/14/19 1541   BP: 125/74   Pulse: 79   Resp: 20   Temp: 98 1 °F (36 7 °C)   SpO2: 94%     General: Awake, Alert, Oriented x 3, Mood and affect appropriate  Respiratory: Respirations even and unlabored  Cardiovascular: Peripheral pulses intact; no edema  Musculoskeletal Exam:   Left lower back tenderness    ASA Score: 1    Patient/Chart Verification  Patient ID Verified: Verbal  Consents Confirmed: To be obtained in the Pre-Procedure area  H&P( within 30 days) Verified: To be obtained in the Pre-Procedure area  Allergies Reviewed:  Yes  Anticoag/NSAID held?: NA  Currently on antibiotics?: No    Assessment: 1  Intervertebral disc disorder with radiculopathy of lumbar region        Plan: Left L4-5 TF PONCHO

## 2019-11-14 NOTE — NURSING NOTE
FQ assessed pt's ability to walk and he had no weakness or  numbness so pt was given permission to drive home per FQ

## 2019-11-21 ENCOUNTER — TELEPHONE (OUTPATIENT)
Dept: PAIN MEDICINE | Facility: CLINIC | Age: 44
End: 2019-11-21

## 2019-11-27 NOTE — TELEPHONE ENCOUNTER
Patient states that he a little more back stiffness and pain that radiates into his left buttock  Patient states that this occurred since the last follow up call  Patient describes the pain as shooting that is intermittent depending on movement  Patient denies any new symptoms  Pain scale is 6/10 with 50% relief

## 2019-11-29 NOTE — TELEPHONE ENCOUNTER
Pt is calling back stating he would like to schedule another injection  Pt can be reached at [phone]      Tasked routed to surgery pool for [place]

## 2019-11-30 DIAGNOSIS — M51.16 INTERVERTEBRAL DISC DISORDER WITH RADICULOPATHY OF LUMBAR REGION: Primary | ICD-10-CM

## 2020-01-14 ENCOUNTER — HOSPITAL ENCOUNTER (OUTPATIENT)
Dept: RADIOLOGY | Facility: CLINIC | Age: 45
Discharge: HOME | End: 2020-01-14
Attending: ANESTHESIOLOGY
Payer: COMMERCIAL

## 2020-01-14 VITALS
RESPIRATION RATE: 20 BRPM | HEART RATE: 58 BPM | TEMPERATURE: 98.7 F | SYSTOLIC BLOOD PRESSURE: 129 MMHG | DIASTOLIC BLOOD PRESSURE: 84 MMHG | OXYGEN SATURATION: 95 %

## 2020-01-14 DIAGNOSIS — M51.16 INTERVERTEBRAL DISC DISORDER WITH RADICULOPATHY OF LUMBAR REGION: ICD-10-CM

## 2020-01-14 PROCEDURE — 64483 NJX AA&/STRD TFRM EPI L/S 1: CPT | Performed by: ANESTHESIOLOGY

## 2020-01-14 PROCEDURE — 64484 NJX AA&/STRD TFRM EPI L/S EA: CPT | Performed by: ANESTHESIOLOGY

## 2020-01-14 RX ORDER — METHYLPREDNISOLONE ACETATE 80 MG/ML
80 INJECTION, SUSPENSION INTRA-ARTICULAR; INTRALESIONAL; INTRAMUSCULAR; PARENTERAL; SOFT TISSUE ONCE
Status: COMPLETED | OUTPATIENT
Start: 2020-01-14 | End: 2020-01-14

## 2020-01-14 RX ORDER — BUPIVACAINE HCL/PF 2.5 MG/ML
10 VIAL (ML) INJECTION ONCE
Status: COMPLETED | OUTPATIENT
Start: 2020-01-14 | End: 2020-01-14

## 2020-01-14 RX ORDER — 0.9 % SODIUM CHLORIDE 0.9 %
10 VIAL (ML) INJECTION ONCE
Status: COMPLETED | OUTPATIENT
Start: 2020-01-14 | End: 2020-01-14

## 2020-01-14 RX ADMIN — BUPIVACAINE HYDROCHLORIDE 2 ML: 2.5 INJECTION, SOLUTION EPIDURAL; INFILTRATION; INTRACAUDAL at 13:28

## 2020-01-14 RX ADMIN — Medication 4 ML: at 13:25

## 2020-01-14 RX ADMIN — SODIUM CHLORIDE 4 ML: 9 INJECTION, SOLUTION INTRAMUSCULAR; INTRAVENOUS; SUBCUTANEOUS at 13:25

## 2020-01-14 RX ADMIN — METHYLPREDNISOLONE ACETATE 80 MG: 80 INJECTION, SUSPENSION INTRA-ARTICULAR; INTRALESIONAL; INTRAMUSCULAR; PARENTERAL; SOFT TISSUE at 13:28

## 2020-01-14 RX ADMIN — IOHEXOL 1 ML: 300 INJECTION, SOLUTION INTRAVENOUS at 13:28

## 2020-01-14 NOTE — H&P
History of Present Illness: The patient is a 45 y o  male who presents with complaints of left lower back and leg pain secondary to lumbar degenerative disc disease and is here today for left L4-5 transforaminal epidural steroid injection  Patient Active Problem List   Diagnosis    Anxiety    Gilbert's syndrome    Tendinitis of left rotator cuff    Arthritis of left acromioclavicular joint    Lumbar radiculitis       Past Medical History:   Diagnosis Date    Anxiety     Conjunctivitis of left eye 2/21/2018       Past Surgical History:   Procedure Laterality Date    TONSILLECTOMY           Current Outpatient Medications:     FLUoxetine (PROzac) 20 mg capsule, Take 1 capsule (20 mg total) by mouth daily, Disp: 90 capsule, Rfl: 1    Current Facility-Administered Medications:     bupivacaine (PF) (MARCAINE) 0 25 % injection 10 mL, 10 mL, Epidural, Once, Blaire Be MD    iohexol (OMNIPAQUE) 300 mg/mL injection 50 mL, 50 mL, Epidural, Once, Blaire Be MD    lidocaine (PF) (XYLOCAINE-MPF) 2 % injection 5 mL, 5 mL, Infiltration, Once, Blaire Be MD    methylPREDNISolone acetate (DEPO-MEDROL) injection 80 mg, 80 mg, Epidural, Once, Blaire Be MD    sodium chloride (PF) 0 9 % injection 10 mL, 10 mL, Infiltration, Once, Blaire Be MD    No Known Allergies    Physical Exam:   Vitals:    01/14/20 1315   BP: 126/83   Pulse: 61   Resp: 20   Temp: 98 7 °F (37 1 °C)   SpO2: 96%     General: Awake, Alert, Oriented x 3, Mood and affect appropriate  Respiratory: Respirations even and unlabored  Cardiovascular: Peripheral pulses intact; no edema  Musculoskeletal Exam:   Left lower back tenderness    ASA Score: 2    Patient/Chart Verification  Patient ID Verified: Verbal  Consents Confirmed: To be obtained in the Pre-Procedure area  H&P( within 30 days) Verified: To be obtained in the Pre-Procedure area  Allergies Reviewed:  Yes  Anticoag/NSAID held?: NA  Currently on antibiotics?: No    Assessment:   1   Intervertebral disc disorder with radiculopathy of lumbar region        Plan: L L4-5 TFESI

## 2020-01-14 NOTE — DISCHARGE INSTR - LAB
Epidural Steroid Injection   WHAT YOU NEED TO KNOW:   An epidural steroid injection (PONCHO) is a procedure to inject steroid medicine into the epidural space  The epidural space is between your spinal cord and vertebrae  Steroids reduce inflammation and fluid buildup in your spine that may be causing pain  You may be given pain medicine along with the steroids  ACTIVITY  · Do not drive or operate machinery today  · No strenuous activity today - bending, lifting, etc   · You may resume normal activites starting tomorrow - start slowly and as tolerated  · You may shower today, but no tub baths or hot tubs  · You may have numbness for several hours from the local anesthetic  Please use caution and common sense, especially with weight-bearing activities  CARE OF THE INJECTION SITE  · If you have soreness or pain, apply ice to the area today (20 minutes on/20 minutes off)  · Starting tomorrow, you may use warm, moist heat or ice if needed  · You may have an increase or change in your discomfort for 36-48 hours after your treatment  · Apply ice and continue with any pain medication you have been prescribed  · [facility]ify [facility] if you have any of the following: redness, drainage, swelling, headache, stiff neck or fever above 100°F     SPECIAL INSTRUCTIONS  · Our office will contact you in approximately 7 days for a progress report  MEDICATIONS  · Continue to take all routine medications  · Our office may have instructed you to hold some medications  If you have a problem specifically related to your procedure, please call our office at [phone]  Problems not related to your procedure should be directed to your primary care physician

## 2020-01-21 ENCOUNTER — TELEPHONE (OUTPATIENT)
Dept: PAIN MEDICINE | Facility: CLINIC | Age: 45
End: 2020-01-21

## 2020-01-22 NOTE — TELEPHONE ENCOUNTER
Given injection more time, can take 2 weeks for full effects    Please follow up with patient next week

## 2020-02-19 DIAGNOSIS — F41.9 ANXIETY: ICD-10-CM

## 2020-02-20 RX ORDER — FLUOXETINE HYDROCHLORIDE 20 MG/1
20 CAPSULE ORAL DAILY
Qty: 30 CAPSULE | Refills: 0 | Status: SHIPPED | OUTPATIENT
Start: 2020-02-20 | End: 2020-03-14 | Stop reason: SDUPTHER

## 2020-03-14 ENCOUNTER — OFFICE VISIT (OUTPATIENT)
Dept: FAMILY MEDICINE CLINIC | Facility: CLINIC | Age: 45
End: 2020-03-14
Payer: COMMERCIAL

## 2020-03-14 VITALS
BODY MASS INDEX: 27.76 KG/M2 | WEIGHT: 228 LBS | SYSTOLIC BLOOD PRESSURE: 128 MMHG | HEIGHT: 76 IN | DIASTOLIC BLOOD PRESSURE: 80 MMHG

## 2020-03-14 DIAGNOSIS — F41.9 ANXIETY: ICD-10-CM

## 2020-03-14 PROCEDURE — 3008F BODY MASS INDEX DOCD: CPT | Performed by: FAMILY MEDICINE

## 2020-03-14 PROCEDURE — 99213 OFFICE O/P EST LOW 20 MIN: CPT | Performed by: FAMILY MEDICINE

## 2020-03-14 RX ORDER — FLUOXETINE HYDROCHLORIDE 20 MG/1
20 CAPSULE ORAL DAILY
Qty: 90 CAPSULE | Refills: 1 | Status: SHIPPED | OUTPATIENT
Start: 2020-03-14 | End: 2020-10-09 | Stop reason: SDUPTHER

## 2020-03-14 NOTE — ASSESSMENT & PLAN NOTE
The patient suffers from acute anxiety disorder which is well treated with fluoxetine 20 mg daily  He is going to continue with same  Will see him back in 6 months or sooner as needed  He agrees

## 2020-03-14 NOTE — PROGRESS NOTES
Assessment/Plan:  Anxiety  The patient suffers from acute anxiety disorder which is well treated with fluoxetine 20 mg daily  He is going to continue with same  Will see him back in 6 months or sooner as needed  He agrees  He has not had any blood work since 2016 and will ask him to repeat it at his next visit  Diagnoses and all orders for this visit:    Anxiety  -     FLUoxetine (PROzac) 20 mg capsule; Take 1 capsule (20 mg total) by mouth daily          Subjective:   Chief Complaint   Patient presents with    Follow-up     Med Check     BMI Counseling: Body mass index is 27 75 kg/m²  The BMI is above normal  Nutrition recommendations include decreasing portion sizes, encouraging healthy choices of fruits and vegetables, consuming healthier snacks and moderation in carbohydrate intake  Exercise recommendations include moderate physical activity 150 minutes/week and exercising 3-5 times per week  No pharmacotherapy was ordered  Patient ID: Rachel White is a 45 y o  male  Sleep OK, interest OK, energy good, concentration so so, appetite good, no suicidal ideation  Shoulder is fine  HPI  The patient is a 27-year-old male who presents today for follow-up of anxiety disorder  He states that he has been compliant with his fluoxetine and has no complaints with same  He states he sleeping well his interest level is good  His energy level is good though his concentration is so-so at times  His appetite is good and he has no suicidal ideation  He states that his shoulder is fine  No GI or  complaint  The following portions of the patient's history were reviewed and updated as appropriate: allergies, current medications, past family history, past medical history, past social history, past surgical history and problem list     ROS    Limited pertinent review of systems is per the HPI  Objective:    Physical Exam   Constitutional: He is oriented to person, place, and time   He appears well-developed and well-nourished  No distress  Mildly overweight but muscular   Neck: No JVD present  No thyromegaly present  Cardiovascular: Normal rate and regular rhythm  Pulmonary/Chest: Effort normal and breath sounds normal    Musculoskeletal: He exhibits no edema  Lymphadenopathy:     He has no cervical adenopathy  Neurological: He is alert and oriented to person, place, and time  Psychiatric: He has a normal mood and affect  His behavior is normal  Thought content normal    Nursing note and vitals reviewed

## 2020-10-09 DIAGNOSIS — F41.9 ANXIETY: ICD-10-CM

## 2020-10-09 RX ORDER — FLUOXETINE HYDROCHLORIDE 20 MG/1
20 CAPSULE ORAL DAILY
Qty: 30 CAPSULE | Refills: 0 | Status: SHIPPED | OUTPATIENT
Start: 2020-10-09 | End: 2020-10-17 | Stop reason: SDUPTHER

## 2020-10-17 ENCOUNTER — OFFICE VISIT (OUTPATIENT)
Dept: FAMILY MEDICINE CLINIC | Facility: CLINIC | Age: 45
End: 2020-10-17
Payer: COMMERCIAL

## 2020-10-17 VITALS
TEMPERATURE: 97.3 F | WEIGHT: 225 LBS | DIASTOLIC BLOOD PRESSURE: 80 MMHG | SYSTOLIC BLOOD PRESSURE: 118 MMHG | HEIGHT: 76 IN | BODY MASS INDEX: 27.4 KG/M2

## 2020-10-17 DIAGNOSIS — F41.9 ANXIETY: ICD-10-CM

## 2020-10-17 DIAGNOSIS — E80.4 GILBERT'S SYNDROME: Primary | ICD-10-CM

## 2020-10-17 DIAGNOSIS — Z13.0 SCREENING, ANEMIA, DEFICIENCY, IRON: ICD-10-CM

## 2020-10-17 DIAGNOSIS — Z13.220 SCREENING FOR LIPID DISORDERS: ICD-10-CM

## 2020-10-17 PROBLEM — M75.82 TENDINITIS OF LEFT ROTATOR CUFF: Status: RESOLVED | Noted: 2018-11-30 | Resolved: 2020-10-17

## 2020-10-17 PROBLEM — M54.16 LUMBAR RADICULITIS: Status: RESOLVED | Noted: 2019-07-27 | Resolved: 2020-10-17

## 2020-10-17 PROCEDURE — 99213 OFFICE O/P EST LOW 20 MIN: CPT | Performed by: FAMILY MEDICINE

## 2020-10-17 PROCEDURE — 36415 COLL VENOUS BLD VENIPUNCTURE: CPT | Performed by: FAMILY MEDICINE

## 2020-10-17 PROCEDURE — 1036F TOBACCO NON-USER: CPT | Performed by: FAMILY MEDICINE

## 2020-10-17 RX ORDER — FLUOXETINE HYDROCHLORIDE 20 MG/1
20 CAPSULE ORAL DAILY
Qty: 90 CAPSULE | Refills: 1 | Status: SHIPPED | OUTPATIENT
Start: 2020-10-17 | End: 2021-06-25 | Stop reason: SDUPTHER

## 2020-10-18 LAB
ALBUMIN SERPL-MCNC: 4.6 G/DL (ref 3.6–5.1)
ALBUMIN/GLOB SERPL: 2 (CALC) (ref 1–2.5)
ALP SERPL-CCNC: 61 U/L (ref 36–130)
ALT SERPL-CCNC: 20 U/L (ref 9–46)
AST SERPL-CCNC: 17 U/L (ref 10–40)
BILIRUB SERPL-MCNC: 0.9 MG/DL (ref 0.2–1.2)
BUN SERPL-MCNC: 20 MG/DL (ref 7–25)
BUN/CREAT SERPL: ABNORMAL (CALC) (ref 6–22)
CALCIUM SERPL-MCNC: 9.5 MG/DL (ref 8.6–10.3)
CHLORIDE SERPL-SCNC: 105 MMOL/L (ref 98–110)
CHOLEST SERPL-MCNC: 164 MG/DL
CHOLEST/HDLC SERPL: 2.8 (CALC)
CO2 SERPL-SCNC: 28 MMOL/L (ref 20–32)
CREAT SERPL-MCNC: 1.03 MG/DL (ref 0.6–1.35)
ERYTHROCYTE [DISTWIDTH] IN BLOOD BY AUTOMATED COUNT: 12.5 % (ref 11–15)
GLOBULIN SER CALC-MCNC: 2.3 G/DL (CALC) (ref 1.9–3.7)
GLUCOSE SERPL-MCNC: 107 MG/DL (ref 65–99)
HCT VFR BLD AUTO: 46.9 % (ref 38.5–50)
HDLC SERPL-MCNC: 59 MG/DL
HGB BLD-MCNC: 15.8 G/DL (ref 13.2–17.1)
LDLC SERPL CALC-MCNC: 90 MG/DL (CALC)
MCH RBC QN AUTO: 29.9 PG (ref 27–33)
MCHC RBC AUTO-ENTMCNC: 33.7 G/DL (ref 32–36)
MCV RBC AUTO: 88.7 FL (ref 80–100)
NONHDLC SERPL-MCNC: 105 MG/DL (CALC)
PLATELET # BLD AUTO: 262 THOUSAND/UL (ref 140–400)
PMV BLD REES-ECKER: 11.4 FL (ref 7.5–12.5)
POTASSIUM SERPL-SCNC: 4.5 MMOL/L (ref 3.5–5.3)
PROT SERPL-MCNC: 6.9 G/DL (ref 6.1–8.1)
RBC # BLD AUTO: 5.29 MILLION/UL (ref 4.2–5.8)
SL AMB EGFR AFRICAN AMERICAN: 103 ML/MIN/1.73M2
SL AMB EGFR NON AFRICAN AMERICAN: 89 ML/MIN/1.73M2
SODIUM SERPL-SCNC: 140 MMOL/L (ref 135–146)
TRIGL SERPL-MCNC: 65 MG/DL
WBC # BLD AUTO: 6 THOUSAND/UL (ref 3.8–10.8)

## 2021-06-25 DIAGNOSIS — F41.9 ANXIETY: ICD-10-CM

## 2021-06-25 RX ORDER — FLUOXETINE HYDROCHLORIDE 20 MG/1
20 CAPSULE ORAL DAILY
Qty: 30 CAPSULE | Refills: 0 | Status: SHIPPED | OUTPATIENT
Start: 2021-06-25 | End: 2021-07-22 | Stop reason: SDUPTHER

## 2021-07-22 ENCOUNTER — OFFICE VISIT (OUTPATIENT)
Dept: FAMILY MEDICINE CLINIC | Facility: CLINIC | Age: 46
End: 2021-07-22
Payer: COMMERCIAL

## 2021-07-22 VITALS
BODY MASS INDEX: 27.89 KG/M2 | WEIGHT: 229 LBS | DIASTOLIC BLOOD PRESSURE: 70 MMHG | HEIGHT: 76 IN | SYSTOLIC BLOOD PRESSURE: 118 MMHG

## 2021-07-22 DIAGNOSIS — F41.9 ANXIETY: ICD-10-CM

## 2021-07-22 PROCEDURE — 1036F TOBACCO NON-USER: CPT | Performed by: FAMILY MEDICINE

## 2021-07-22 PROCEDURE — 3008F BODY MASS INDEX DOCD: CPT | Performed by: FAMILY MEDICINE

## 2021-07-22 PROCEDURE — 3725F SCREEN DEPRESSION PERFORMED: CPT | Performed by: FAMILY MEDICINE

## 2021-07-22 PROCEDURE — 99213 OFFICE O/P EST LOW 20 MIN: CPT | Performed by: FAMILY MEDICINE

## 2021-07-22 RX ORDER — FLUOXETINE HYDROCHLORIDE 20 MG/1
20 CAPSULE ORAL DAILY
Qty: 90 CAPSULE | Refills: 1 | Status: SHIPPED | OUTPATIENT
Start: 2021-07-22 | End: 2022-02-14 | Stop reason: SDUPTHER

## 2021-07-22 NOTE — ASSESSMENT & PLAN NOTE
Patient is a 21-year-old male with generalized anxiety disorder  Symptoms are well remediated with fluoxetine  He will continue with his medication  Will see him back in 6 months or sooner as needed  We did review his blood work from last fall which was acceptable  He is in agreement with this plan

## 2021-07-22 NOTE — PROGRESS NOTES
BMI Counseling: Body mass index is 27 87 kg/m²  The BMI is above normal  Nutrition recommendations include decreasing portion sizes, encouraging healthy choices of fruits and vegetables, consuming healthier snacks, limiting drinks that contain sugar and moderation in carbohydrate intake  Exercise recommendations include moderate physical activity 150 minutes/week and exercising 3-5 times per week  No pharmacotherapy was ordered  Assessment/Plan:  Anxiety  Patient is a 44-year-old male with generalized anxiety disorder  Symptoms are well remediated with fluoxetine  He will continue with his medication  Will see him back in 6 months or sooner as needed  We did review his blood work from last fall which was acceptable  He is in agreement with this plan  Diagnoses and all orders for this visit:    Anxiety  -     FLUoxetine (PROzac) 20 mg capsule; Take 1 capsule (20 mg total) by mouth daily          Subjective:   Chief Complaint   Patient presents with    Follow-up     Med Check        Patient ID: Gwynneth Celestino is a 39 y o  male  HPI     The patient is a 44-year-old male who presents today for routine follow-up of chronic anxiety  He has been compliant with his fluoxetine and states that it remains effective at helping him deal with his generalized anxiety  He has no side effects from the medication  He does note that his sleep is so-so  He has difficulty falling asleep and staying asleep but does feel that he gets about 6 hours sleep per night  He states that his interest level is good as ability to concentrate is acceptable  His appetite is normal   His energy is fairly good  He has no suicidal ideation  Review of Systems   Cardiovascular: Negative  Respiratory: Negative  Gastrointestinal: Negative  Genitourinary: Negative  Neurological: Negative  Psychiatric/Behavioral: Negative for depression  The patient has insomnia and is nervous/anxious  Objective:    Physical Exam  Constitutional:       Appearance: Normal appearance  Cardiovascular:      Rate and Rhythm: Normal rate and regular rhythm  Pulmonary:      Effort: Pulmonary effort is normal       Breath sounds: Normal breath sounds  Neurological:      Mental Status: He is alert and oriented to person, place, and time  Psychiatric:         Mood and Affect: Mood normal          Thought Content:  Thought content normal          Judgment: Judgment normal          Wt Readings from Last 12 Encounters:   07/22/21 104 kg (229 lb)   10/17/20 102 kg (225 lb)   03/14/20 103 kg (228 lb)   10/02/19 105 kg (232 lb)   08/30/19 107 kg (235 lb 12 8 oz)   07/27/19 104 kg (229 lb)   11/30/18 105 kg (232 lb)   02/21/18 104 kg (229 lb)   06/29/17 101 kg (222 lb)   02/01/17 93 9 kg (207 lb)   12/27/16 95 3 kg (210 lb)   11/28/16 89 4 kg (197 lb)   ]

## 2021-09-23 NOTE — ASSESSMENT & PLAN NOTE
Chronic pain of his lumbar spine is worsening with some radicular symptoms  We are going to have him go for x-rays of the lumbar spine  Will follow up with him when results are available  Naprosyn has been ineffective  Will consider physical therapy versus referral to Orthopedic surgery/pain management for further evaluation 
Going to send in for x-rays of his left shoulder  He may require referral to Orthopedic surgery due to his chronic pain 
We are going to refills fluoxetine which appears effective at alleviating his chronic anxiety 
No

## 2022-02-14 DIAGNOSIS — F41.9 ANXIETY: ICD-10-CM

## 2022-02-14 RX ORDER — FLUOXETINE HYDROCHLORIDE 20 MG/1
20 CAPSULE ORAL DAILY
Qty: 30 CAPSULE | Refills: 0 | Status: SHIPPED | OUTPATIENT
Start: 2022-02-14 | End: 2022-03-07 | Stop reason: SDUPTHER

## 2022-02-26 NOTE — TELEPHONE ENCOUNTER
Pt reports 50% improvement post inj   Pain level 5/10   He said a lot of the numbness is gone Not applicable

## 2022-03-07 ENCOUNTER — OFFICE VISIT (OUTPATIENT)
Dept: FAMILY MEDICINE CLINIC | Facility: CLINIC | Age: 47
End: 2022-03-07
Payer: COMMERCIAL

## 2022-03-07 VITALS — WEIGHT: 235 LBS | BODY MASS INDEX: 28.62 KG/M2 | HEIGHT: 76 IN

## 2022-03-07 DIAGNOSIS — E80.4 GILBERT'S SYNDROME: ICD-10-CM

## 2022-03-07 DIAGNOSIS — R01.1 SYSTOLIC MURMUR: ICD-10-CM

## 2022-03-07 DIAGNOSIS — G56.03 BILATERAL CARPAL TUNNEL SYNDROME: ICD-10-CM

## 2022-03-07 DIAGNOSIS — F41.9 ANXIETY: Primary | ICD-10-CM

## 2022-03-07 DIAGNOSIS — Z13.0 SCREENING, ANEMIA, DEFICIENCY, IRON: ICD-10-CM

## 2022-03-07 DIAGNOSIS — Z13.220 SCREENING FOR LIPID DISORDERS: ICD-10-CM

## 2022-03-07 PROCEDURE — 1036F TOBACCO NON-USER: CPT | Performed by: FAMILY MEDICINE

## 2022-03-07 PROCEDURE — 99214 OFFICE O/P EST MOD 30 MIN: CPT | Performed by: FAMILY MEDICINE

## 2022-03-07 PROCEDURE — 3008F BODY MASS INDEX DOCD: CPT | Performed by: FAMILY MEDICINE

## 2022-03-07 RX ORDER — FLUOXETINE HYDROCHLORIDE 20 MG/1
20 CAPSULE ORAL DAILY
Qty: 30 CAPSULE | Refills: 5 | Status: SHIPPED | OUTPATIENT
Start: 2022-03-07

## 2022-03-07 NOTE — ASSESSMENT & PLAN NOTE
The patient has a newly noted systolic murmur today  Is relatively faint  S1-S2 are normal   It is heard faintly throughout the precordium  He is currently asymptomatic from a cardiovascular standpoint  We are going to send him for an echocardiogram and re-evaluate when results are available  He agrees with this plan

## 2022-03-07 NOTE — PROGRESS NOTES
Assessment/Plan:  Anxiety  The patient is doing well from an anxiety standpoint  Will continue his fluoxetine 20 mg daily  Will follow-up routinely in 6 months  He agrees with this plan  Bilateral carpal tunnel syndrome  Based on his history and examination he appears to have mild bilateral carpal tunnel syndrome  We discussed this at length today  We are going to give him cock-up splints to try and he is asked call in 6 weeks if his symptoms are not resolving  He will call sooner if he develops any weakness or significant progression of his condition  He agrees with this plan  Systolic murmur  The patient has a newly noted systolic murmur today  Is relatively faint  S1-S2 are normal   It is heard faintly throughout the precordium  He is currently asymptomatic from a cardiovascular standpoint  We are going to send him for an echocardiogram and re-evaluate when results are available  He agrees with this plan  Diagnoses and all orders for this visit:    Anxiety  -     FLUoxetine (PROzac) 20 mg capsule; Take 1 capsule (20 mg total) by mouth daily    Bilateral carpal tunnel syndrome  -     Cock Up Wrist Splint    Systolic murmur  -     Echo complete w/ contrast if indicated; Future    Gilbert's syndrome    Screening, anemia, deficiency, iron    Screening for lipid disorders          Subjective:   Chief Complaint   Patient presents with    Follow-up     Med Check        Patient ID: Killian Agustin is a 42 y o  male  I feel fine, sleep ok, interest ok, energy ok, concentration ok, appetite good, no suicidal ideation  No CVP, GI or  sx  HPI  The patient is a 42-year-old male who presents today for routine follow-up of chronic anxiety for which he takes fluoxetine  He states that overall he is doing well  He states he sleeps fine, interest level is okay as well as energy level  He can concentrate well and his appetite is good  He has had no suicidal ideation    He denies any cardiovascular pulmonary complaint  No headache diplopia, GI or  symptoms  He does note that for the past several months he has been awakening with numbness and tingling and some mild pain in his hands bilaterally  He states that is not always the same hand  He is right-handed  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  The following portions of the patient's history were reviewed and updated as appropriate: allergies, current medications, past family history, past medical history, past social history, past surgical history and problem list     ROS    Per the HPI  Objective:    Physical Exam  Vitals and nursing note reviewed  Constitutional:       Appearance: Normal appearance  Eyes:      General: No scleral icterus  Neck:      Vascular: No carotid bruit  Comments: No JVD  Cardiovascular:      Rate and Rhythm: Normal rate and regular rhythm  Heart sounds: Murmur heard  Comments: There is a 1 to 2/6 systolic ejection murmur  It is noted at the left sternal border and faintly throughout the precordium  Musculoskeletal:      Right lower leg: No edema  Left lower leg: No edema  Comments: He has a negative Tinel sign at the wrist   He has a mildly positive Phalen sign  He has normal bulk tone and strength   Neurological:      General: No focal deficit present  Mental Status: He is alert and oriented to person, place, and time     Psychiatric:         Mood and Affect: Mood normal          Judgment: Judgment normal

## 2022-03-07 NOTE — ASSESSMENT & PLAN NOTE
The patient is doing well from an anxiety standpoint  Will continue his fluoxetine 20 mg daily  Will follow-up routinely in 6 months  He agrees with this plan

## 2022-03-07 NOTE — ASSESSMENT & PLAN NOTE
Based on his history and examination he appears to have mild bilateral carpal tunnel syndrome  We discussed this at length today  We are going to give him cock-up splints to try and he is asked call in 6 weeks if his symptoms are not resolving  He will call sooner if he develops any weakness or significant progression of his condition  He agrees with this plan

## 2022-03-23 ENCOUNTER — HOSPITAL ENCOUNTER (OUTPATIENT)
Dept: NON INVASIVE DIAGNOSTICS | Facility: HOSPITAL | Age: 47
Discharge: HOME | End: 2022-03-23
Payer: COMMERCIAL

## 2022-03-23 VITALS
HEART RATE: 58 BPM | HEIGHT: 76 IN | SYSTOLIC BLOOD PRESSURE: 118 MMHG | BODY MASS INDEX: 28.62 KG/M2 | WEIGHT: 235 LBS | DIASTOLIC BLOOD PRESSURE: 70 MMHG

## 2022-03-23 DIAGNOSIS — R01.1 SYSTOLIC MURMUR: ICD-10-CM

## 2022-03-23 LAB
AORTIC ROOT: 2.7 CM
APICAL FOUR CHAMBER EJECTION FRACTION: 70 %
E WAVE DECELERATION TIME: 187 MS
FRACTIONAL SHORTENING: 47 % (ref 28–44)
INTERVENTRICULAR SEPTUM IN DIASTOLE (PARASTERNAL SHORT AXIS VIEW): 1.1 CM
INTERVENTRICULAR SEPTUM: 1.1 CM (ref 0.57–1.08)
LAAS-AP2: 15.9 CM2
LAAS-AP4: 15.1 CM2
LEFT ATRIUM SIZE: 4 CM
LEFT INTERNAL DIMENSION IN SYSTOLE: 2.8 CM (ref 5.43–8.24)
LEFT VENTRICULAR INTERNAL DIMENSION IN DIASTOLE: 5.3 CM (ref 9.16–13.65)
LEFT VENTRICULAR POSTERIOR WALL IN END DIASTOLE: 0.9 CM (ref 0.56–1.06)
LEFT VENTRICULAR STROKE VOLUME: 109 ML
LVSV (TEICH): 109 ML
MV E'TISSUE VEL-SEP: 12 CM/S
MV PEAK A VEL: 0.79 M/S
MV PEAK E VEL: 119 CM/S
MV STENOSIS PRESSURE HALF TIME: 54 MS
MV VALVE AREA P 1/2 METHOD: 4.07 CM2
RA PRESSURE ESTIMATED: 3 MMHG
RIGHT ATRIAL 2D VOLUME: 33 ML
RIGHT ATRIUM AREA SYSTOLE A4C: 14.5 CM2
RIGHT VENTRICLE ID DIMENSION: 3 CM
RV PSP: 23 MMHG
SL CV LEFT ATRIUM LENGTH A2C: 5.4 CM
SL CV LV EF: 60
SL CV PED ECHO LEFT VENTRICLE DIASTOLIC VOLUME (MOD BIPLANE) 2D: 138 ML
SL CV PED ECHO LEFT VENTRICLE SYSTOLIC VOLUME (MOD BIPLANE) 2D: 29 ML
TR MAX PG: 20 MMHG
TR PEAK VELOCITY: 2.3 M/S
TRICUSPID VALVE PEAK REGURGITATION VELOCITY: 2.25 M/S
Z-SCORE OF INTERVENTRICULAR SEPTUM IN END DIASTOLE: 2.15
Z-SCORE OF LEFT VENTRICULAR DIMENSION IN END DIASTOLE: -7.47
Z-SCORE OF LEFT VENTRICULAR DIMENSION IN END SYSTOLE: -6.89
Z-SCORE OF LEFT VENTRICULAR POSTERIOR WALL IN END DIASTOLE: 0.7

## 2022-03-23 PROCEDURE — 93306 TTE W/DOPPLER COMPLETE: CPT | Performed by: INTERNAL MEDICINE

## 2022-03-23 PROCEDURE — 93306 TTE W/DOPPLER COMPLETE: CPT

## 2022-09-06 ENCOUNTER — OFFICE VISIT (OUTPATIENT)
Dept: FAMILY MEDICINE CLINIC | Facility: CLINIC | Age: 47
End: 2022-09-06
Payer: COMMERCIAL

## 2022-09-06 VITALS
WEIGHT: 239 LBS | SYSTOLIC BLOOD PRESSURE: 122 MMHG | HEIGHT: 76 IN | BODY MASS INDEX: 29.1 KG/M2 | DIASTOLIC BLOOD PRESSURE: 80 MMHG

## 2022-09-06 DIAGNOSIS — E80.4 GILBERT'S SYNDROME: ICD-10-CM

## 2022-09-06 DIAGNOSIS — T14.8XXA PUNCTURE WOUND: ICD-10-CM

## 2022-09-06 DIAGNOSIS — Z11.59 ENCOUNTER FOR HEPATITIS C SCREENING TEST FOR LOW RISK PATIENT: ICD-10-CM

## 2022-09-06 DIAGNOSIS — F41.9 ANXIETY: Primary | ICD-10-CM

## 2022-09-06 DIAGNOSIS — Z13.220 SCREENING FOR LIPID DISORDERS: ICD-10-CM

## 2022-09-06 DIAGNOSIS — Z11.4 SCREENING FOR HIV (HUMAN IMMUNODEFICIENCY VIRUS): ICD-10-CM

## 2022-09-06 DIAGNOSIS — Z13.0 SCREENING, ANEMIA, DEFICIENCY, IRON: ICD-10-CM

## 2022-09-06 PROCEDURE — 99213 OFFICE O/P EST LOW 20 MIN: CPT | Performed by: FAMILY MEDICINE

## 2022-09-06 PROCEDURE — 90471 IMMUNIZATION ADMIN: CPT

## 2022-09-06 PROCEDURE — 3725F SCREEN DEPRESSION PERFORMED: CPT | Performed by: FAMILY MEDICINE

## 2022-09-06 PROCEDURE — 36415 COLL VENOUS BLD VENIPUNCTURE: CPT | Performed by: FAMILY MEDICINE

## 2022-09-06 PROCEDURE — 90715 TDAP VACCINE 7 YRS/> IM: CPT

## 2022-09-06 RX ORDER — FLUOXETINE HYDROCHLORIDE 20 MG/1
20 CAPSULE ORAL DAILY
Qty: 90 CAPSULE | Refills: 1 | Status: SHIPPED | OUTPATIENT
Start: 2022-09-06

## 2022-09-06 NOTE — PROGRESS NOTES
BMI Counseling: Body mass index is 29 09 kg/m²  The BMI is above normal  Nutrition recommendations include decreasing portion sizes, encouraging healthy choices of fruits and vegetables, consuming healthier snacks, limiting drinks that contain sugar and moderation in carbohydrate intake  Exercise recommendations include moderate physical activity 150 minutes/week and exercising 3-5 times per week  No pharmacotherapy was ordered  Rationale for BMI follow-up plan is due to patient being overweight or obese  Depression Screening and Follow-up Plan: Patient was screened for depression during today's encounter  They screened negative with a PHQ-2 score of 0  Assessment/Plan:  Systolic murmur  Echo noted trace MR and TR echo noted trace MR and TR  Anxiety  The patient presents today for routine follow-up of chronic anxiety  He continues on fluoxetine which remains effective at treating his condition  He has no side effects from its use  We are going to obtain some routine blood work to include CBC CMP lipids HIV and hepatitis C and we will follow up with him when results are available  He agrees with this plan    Gilbert's syndrome  CMP today  Puncture wound  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 [employer]  We are going to update his Adacel today  He agrees  Diagnoses and all orders for this visit:    Anxiety  -     Comprehensive metabolic panel  -     FLUoxetine (PROzac) 20 mg capsule;  Take 1 capsule (20 mg total) by mouth daily    Screening, anemia, deficiency, iron  -     CBC    Puncture wound  -     TDAP VACCINE GREATER THAN OR EQUAL TO 6YO IM    Screening for lipid disorders  -     Lipid panel    Encounter for hepatitis C screening test for low risk patient  -     Hepatitis C Ab W/Refl To HCV RNA, Qn, PCR    Screening for HIV (human immunodeficiency virus)  -     Human Immunodeficiency Virus 1/2 Antigen / Antibody ( Fourth Generation) with Reflex Testing    Gilbert's syndrome          Subjective:   Chief Complaint   Patient presents with    Follow-up     Med check        Patient ID: Sam Avery is a 42 y o  male  HPI  The patient is a 77-year-old male who presents today for routine follow-up of chronic anxiety  He states that overall he is doing fairly well  He notes that his sleep is intermittent and he sometimes is awakened by thoughts related to were concerns [employer]  He notes that overall his interest level is good as well as energy  He is able to concentrate well and his appetite is good  He has no suicidal ideation  He feels that his fluoxetine is very helpful  He has no cardiovascular pulmonary complaint  No GI or  complaint  The following portions of the patient's history were reviewed and updated as appropriate: allergies, current medications, past family history, past medical history, past social history, past surgical history and problem list     ROS    Per the HPI  Objective:    Physical Exam  Vitals reviewed  Constitutional:       Appearance: Normal appearance  Neck:      Vascular: No carotid bruit  Comments: Thyroid without enlargement or nodule  Cardiovascular:      Rate and Rhythm: Normal rate and regular rhythm  Heart sounds: Murmur heard  Pulmonary:      Effort: Pulmonary effort is normal       Breath sounds: Normal breath sounds  Musculoskeletal:      Right lower leg: No edema  Left lower leg: No edema  Neurological:      Mental Status: He is alert and oriented to person, place, and time  Psychiatric:         Mood and Affect: Mood normal          Thought Content:  Thought content normal

## 2022-09-06 NOTE — ASSESSMENT & PLAN NOTE
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

## 2022-09-06 NOTE — ASSESSMENT & PLAN NOTE
The patient presents today for routine follow-up of chronic anxiety  He continues on fluoxetine which remains effective at treating his condition  He has no side effects from its use  We are going to obtain some routine blood work to include CBC CMP lipids HIV and hepatitis C and we will follow up with him when results are available    He agrees with this plan

## 2022-09-07 DIAGNOSIS — R73.01 IMPAIRED FASTING GLUCOSE: ICD-10-CM

## 2022-09-07 DIAGNOSIS — E78.2 MIXED HYPERLIPIDEMIA: Primary | ICD-10-CM

## 2022-09-07 LAB
ALBUMIN SERPL-MCNC: 4.6 G/DL (ref 3.6–5.1)
ALBUMIN/GLOB SERPL: 1.9 (CALC) (ref 1–2.5)
ALP SERPL-CCNC: 70 U/L (ref 36–130)
ALT SERPL-CCNC: 36 U/L (ref 9–46)
AST SERPL-CCNC: 29 U/L (ref 10–40)
BILIRUB SERPL-MCNC: 0.4 MG/DL (ref 0.2–1.2)
BUN SERPL-MCNC: 22 MG/DL (ref 7–25)
BUN/CREAT SERPL: ABNORMAL (CALC) (ref 6–22)
CALCIUM SERPL-MCNC: 9.9 MG/DL (ref 8.6–10.3)
CHLORIDE SERPL-SCNC: 102 MMOL/L (ref 98–110)
CHOLEST SERPL-MCNC: 268 MG/DL
CHOLEST/HDLC SERPL: 6.9 (CALC)
CO2 SERPL-SCNC: 23 MMOL/L (ref 20–32)
CREAT SERPL-MCNC: 0.97 MG/DL (ref 0.6–1.29)
ERYTHROCYTE [DISTWIDTH] IN BLOOD BY AUTOMATED COUNT: 12.6 % (ref 11–15)
GFR/BSA.PRED SERPLBLD CYS-BASED-ARV: 99 ML/MIN/1.73M2
GLOBULIN SER CALC-MCNC: 2.4 G/DL (CALC) (ref 1.9–3.7)
GLUCOSE SERPL-MCNC: 108 MG/DL (ref 65–99)
HCT VFR BLD AUTO: 46.1 % (ref 38.5–50)
HCV AB S/CO SERPL IA: <0.02
HCV AB SERPL QL IA: NORMAL
HDLC SERPL-MCNC: 39 MG/DL
HGB BLD-MCNC: 15.7 G/DL (ref 13.2–17.1)
HIV 1+2 AB+HIV1 P24 AG SERPL QL IA: NORMAL
MCH RBC QN AUTO: 30.9 PG (ref 27–33)
MCHC RBC AUTO-ENTMCNC: 34.1 G/DL (ref 32–36)
MCV RBC AUTO: 90.7 FL (ref 80–100)
NONHDLC SERPL-MCNC: 229 MG/DL (CALC)
PLATELET # BLD AUTO: 300 THOUSAND/UL (ref 140–400)
PMV BLD REES-ECKER: 11 FL (ref 7.5–12.5)
POTASSIUM SERPL-SCNC: 4.4 MMOL/L (ref 3.5–5.3)
PROT SERPL-MCNC: 7 G/DL (ref 6.1–8.1)
RBC # BLD AUTO: 5.08 MILLION/UL (ref 4.2–5.8)
SODIUM SERPL-SCNC: 138 MMOL/L (ref 135–146)
TRIGL SERPL-MCNC: 1587 MG/DL
WBC # BLD AUTO: 7.5 THOUSAND/UL (ref 3.8–10.8)

## 2022-09-07 NOTE — PROGRESS NOTES
The patient's lipid profile significantly deteriorated with triglycerides of 1500 which were normal last year  His lipid profile was not fasting  Also mildly elevated blood sugar  I sent him a my chart message to go for fasting lipids as well as hemoglobin A1c  We placed this order presently

## 2023-03-10 DIAGNOSIS — F41.9 ANXIETY: ICD-10-CM

## 2023-03-30 ENCOUNTER — OFFICE VISIT (OUTPATIENT)
Dept: FAMILY MEDICINE CLINIC | Facility: CLINIC | Age: 48
End: 2023-03-30

## 2023-03-30 VITALS
OXYGEN SATURATION: 98 % | RESPIRATION RATE: 16 BRPM | DIASTOLIC BLOOD PRESSURE: 70 MMHG | BODY MASS INDEX: 27.4 KG/M2 | SYSTOLIC BLOOD PRESSURE: 110 MMHG | WEIGHT: 225 LBS | HEIGHT: 76 IN | HEART RATE: 89 BPM

## 2023-03-30 DIAGNOSIS — R20.2 NUMBNESS AND TINGLING IN LEFT ARM: Primary | ICD-10-CM

## 2023-03-30 DIAGNOSIS — R20.0 NUMBNESS AND TINGLING IN LEFT ARM: Primary | ICD-10-CM

## 2023-03-30 DIAGNOSIS — E78.2 MIXED HYPERLIPIDEMIA: ICD-10-CM

## 2023-03-30 DIAGNOSIS — R73.01 IMPAIRED FASTING GLUCOSE: ICD-10-CM

## 2023-03-30 DIAGNOSIS — F41.9 ANXIETY: ICD-10-CM

## 2023-03-30 PROBLEM — T14.8XXA PUNCTURE WOUND: Status: RESOLVED | Noted: 2022-09-06 | Resolved: 2023-03-30

## 2023-03-30 RX ORDER — FLUOXETINE HYDROCHLORIDE 40 MG/1
40 CAPSULE ORAL DAILY
Qty: 90 CAPSULE | Refills: 0 | Status: SHIPPED | OUTPATIENT
Start: 2023-03-30
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
## 2023-03-30 NOTE — PATIENT INSTRUCTIONS
Please obtain the labs that I have ordered for you fasting, no food or drink except for water for 8 to 12 hours before  Please start using ibuprofen 600 mg twice daily for the next 1 to 2 weeks  Please make sure you are monitoring your symptoms and seeing if this improves them      We went over some of the anatomy and function of the shoulder, if the ibuprofen is not helpful in the next 2 weeks then I would recommend starting physical therapy and we may even want to consider a more specific test     Please continue to monitor your symptoms as well, if you have any increase in shortness of breath, chest pain, diaphoresis or sweating, or lightheadedness or dizziness when the pain is occurring please go to the emergency room for further follow-up

## 2023-06-08 RX ORDER — FLUOXETINE HYDROCHLORIDE 20 MG/1
CAPSULE ORAL
Qty: 90 CAPSULE | Refills: 1 | OUTPATIENT
Start: 2023-06-08

## 2023-06-28 NOTE — TELEPHONE ENCOUNTER
Hi, this is Mago Garcia  My birthday is [birth year]  I'm calling because I am out of Prozac and I don't have any refills left  The pharmacy tried contacting Dr Sherrell Arroyo but he hasn't got back to them yet  So if you could please return my call at [phone]  I'd appreciate it  Thank you      Left a detailed message for patient to schedule a follow up visit with Dr Sherrell Arroyo was to follow up in April (4 weeks) from last follow up

## 2023-10-24 ENCOUNTER — VBI (OUTPATIENT)
Dept: ADMINISTRATIVE | Facility: OTHER | Age: 48
End: 2023-10-24

## 2023-11-03 LAB
BUN SERPL-MCNC: 24 MG/DL (ref 7–25)
BUN/CREAT SERPL: ABNORMAL (CALC) (ref 6–22)
CALCIUM SERPL-MCNC: 9.4 MG/DL (ref 8.6–10.3)
CHLORIDE SERPL-SCNC: 103 MMOL/L (ref 98–110)
CHOLEST SERPL-MCNC: 179 MG/DL
CHOLEST/HDLC SERPL: 3 (CALC)
CO2 SERPL-SCNC: 29 MMOL/L (ref 20–32)
CREAT SERPL-MCNC: 1.06 MG/DL (ref 0.6–1.29)
GFR/BSA.PRED SERPLBLD CYS-BASED-ARV: 88 ML/MIN/1.73M2
GLUCOSE SERPL-MCNC: 111 MG/DL (ref 65–99)
HDLC SERPL-MCNC: 59 MG/DL
LDLC SERPL CALC-MCNC: 103 MG/DL (CALC)
NONHDLC SERPL-MCNC: 120 MG/DL (CALC)
POTASSIUM SERPL-SCNC: 4.2 MMOL/L (ref 3.5–5.3)
SODIUM SERPL-SCNC: 139 MMOL/L (ref 135–146)
TRIGL SERPL-MCNC: 84 MG/DL

## 2023-11-06 DIAGNOSIS — F41.9 ANXIETY: ICD-10-CM

## 2023-11-06 RX ORDER — FLUOXETINE HYDROCHLORIDE 40 MG/1
40 CAPSULE ORAL DAILY
Qty: 90 CAPSULE | Refills: 0 | Status: SHIPPED | OUTPATIENT
Start: 2023-11-06

## 2023-11-12 ENCOUNTER — TELEPHONE (OUTPATIENT)
Dept: FAMILY MEDICINE CLINIC | Facility: CLINIC | Age: 48
End: 2023-11-12

## 2023-11-12 NOTE — TELEPHONE ENCOUNTER
Called patient and left a message to check MyChart. I reviewed his labs, his cholesterol significantly improved from last year, he continues to have elevated fasting sugars. Will likely require follow-up with hemoglobin A1c, may consider in the office.

## 2023-12-01 ENCOUNTER — OFFICE VISIT (OUTPATIENT)
Dept: FAMILY MEDICINE CLINIC | Facility: CLINIC | Age: 48
End: 2023-12-01
Payer: COMMERCIAL

## 2023-12-01 VITALS
DIASTOLIC BLOOD PRESSURE: 70 MMHG | WEIGHT: 230 LBS | RESPIRATION RATE: 16 BRPM | HEART RATE: 90 BPM | BODY MASS INDEX: 28.01 KG/M2 | HEIGHT: 76 IN | OXYGEN SATURATION: 97 % | SYSTOLIC BLOOD PRESSURE: 120 MMHG

## 2023-12-01 DIAGNOSIS — Z13.0 SCREENING FOR DEFICIENCY ANEMIA: ICD-10-CM

## 2023-12-01 DIAGNOSIS — R20.2 NUMBNESS AND TINGLING IN LEFT ARM: ICD-10-CM

## 2023-12-01 DIAGNOSIS — M25.512 CHRONIC LEFT SHOULDER PAIN: ICD-10-CM

## 2023-12-01 DIAGNOSIS — Z00.00 ENCOUNTER FOR SCREENING AND PREVENTATIVE CARE: Primary | ICD-10-CM

## 2023-12-01 DIAGNOSIS — R20.0 NUMBNESS AND TINGLING IN LEFT ARM: ICD-10-CM

## 2023-12-01 DIAGNOSIS — Z12.5 SCREENING FOR MALIGNANT NEOPLASM OF PROSTATE: ICD-10-CM

## 2023-12-01 DIAGNOSIS — Z13.220 SCREENING CHOLESTEROL LEVEL: ICD-10-CM

## 2023-12-01 DIAGNOSIS — R73.01 ELEVATED FASTING BLOOD SUGAR: ICD-10-CM

## 2023-12-01 DIAGNOSIS — M19.012 ARTHRITIS OF LEFT ACROMIOCLAVICULAR JOINT: ICD-10-CM

## 2023-12-01 DIAGNOSIS — D22.9 MULTIPLE ATYPICAL NEVI: ICD-10-CM

## 2023-12-01 DIAGNOSIS — F41.9 ANXIETY: ICD-10-CM

## 2023-12-01 DIAGNOSIS — G89.29 CHRONIC LEFT SHOULDER PAIN: ICD-10-CM

## 2023-12-01 DIAGNOSIS — Z13.1 SCREENING FOR DIABETES MELLITUS: ICD-10-CM

## 2023-12-01 DIAGNOSIS — D22.9 MULTIPLE NEVI: ICD-10-CM

## 2023-12-01 DIAGNOSIS — M25.612 DECREASED ROM OF LEFT SHOULDER: ICD-10-CM

## 2023-12-01 LAB — SL AMB POCT HEMOGLOBIN AIC: 5.4 (ref ?–6.5)

## 2023-12-01 PROCEDURE — 99214 OFFICE O/P EST MOD 30 MIN: CPT | Performed by: FAMILY MEDICINE

## 2023-12-01 PROCEDURE — 99396 PREV VISIT EST AGE 40-64: CPT | Performed by: FAMILY MEDICINE

## 2023-12-01 PROCEDURE — 83036 HEMOGLOBIN GLYCOSYLATED A1C: CPT | Performed by: FAMILY MEDICINE

## 2023-12-01 RX ORDER — FLUOXETINE HYDROCHLORIDE 40 MG/1
40 CAPSULE ORAL DAILY
Qty: 90 CAPSULE | Refills: 0
Start: 2023-12-01

## 2023-12-01 NOTE — PROGRESS NOTES
ADULT ANNUAL [facility]    NAME: Navdeep Christianson  AGE: 57 y.o. SEX: male  : 1975     DATE: 2023     Assessment and Plan:   Jas Chan was seen today for annual exam.    Diagnoses and all orders for this visit:    Encounter for screening and preventative care  Screening for deficiency anemia  Screening cholesterol level  Screening for malignant neoplasm of prostate  Screening for diabetes mellitus  Patient to obtain yearly labs for screening, will obtain in 6 months. Will obtain labs for cell lines, kidney function, liver function, glucose, cholesterol, and he was interested in testing after shared decision making.    -     CBC and differential; Future  -     Lipid panel; Future  -     Comprehensive metabolic panel; Future  -     PSA, Total Screen; Future    Elevated fasting blood sugar  Patient has elevated fasting sugar. He was tested in the office for hemoglobin A1c POCT. Value of 5.4%. recommended monitoring his carb intake still and attempting to improve diet/ exercise the best he can. -     Comprehensive metabolic panel; Future    Arthritis of left acromioclavicular joint  Chronic left shoulder pain  Decreased ROM of left shoulder  Patient has chronic shoulder symptoms. Will likely need to go through PT, but preferred to see orthopedic physicians first prior to more conservative management. XR obtained since he will likely need this prior to MRI. -     XR shoulder 2+ vw left; Future  -     Ambulatory Referral to Orthopedic Surgery; Future    Anxiety  Stable. Patient to continue current dosing.   -     FLUoxetine (PROzac) 40 MG capsule; Take 1 capsule (40 mg total) by mouth daily    Numbness and tingling in left arm  Resolved. Patient continues to have shoulder pain, but not the down distal dside effects of numbness and tingling. Multiple nevi  Multiple atypical nevi  Patient has significant sun exposure.   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 He has multiple nevi. Some atypical in shape but not color/ size. Red skin check from dermatology. -     Ambulatory Referral to Dermatology; Future      Immunizations and preventive care screenings were discussed with patient today. Appropriate education was printed on patient's after visit summary. Discussed risks and benefits of prostate cancer screening. We discussed the controversial history of PSA screening for prostate cancer in the [facility] as well as the risk of over detection and over treatment of prostate cancer by way of PSA screening. The patient understands that PSA blood testing is an imperfect way to screen for prostate cancer and that elevated PSA levels in the blood may also be caused by infection, inflammation, prostatic trauma or manipulation, urological procedures, or by benign prostatic enlargement. The role of the digital rectal examination in prostate cancer screening was also discussed and I discussed with him that there is large interobserver variability in the findings of digital rectal examination. Counseling:  Alcohol/drug use: discussed moderation in alcohol intake, the recommendations for healthy alcohol use, and avoidance of illicit drug use. Dental Health: discussed importance of regular tooth brushing, flossing, and dental visits. Injury prevention: discussed safety/seat belts, safety helmets, smoke detectors, carbon dioxide detectors, and smoking near bedding or upholstery. Sexual health: discussed sexually transmitted diseases, partner selection, use of condoms, avoidance of unintended pregnancy, and contraceptive alternatives. Exercise: the importance of regular exercise/physical activity was discussed. Recommend exercise 3-5 times per week for at least 30 minutes. BMI Counseling: Body mass index is 28 kg/m².  The BMI is above normal. Nutrition recommendations include decreasing portion sizes, decreasing fast food intake and consuming healthier snacks. Exercise recommendations include moderate physical activity 150 minutes/week and exercising 3-5 times per week. Rationale for BMI follow-up plan is due to patient being overweight or obese. Depression Screening and Follow-up Plan: Patient was screened for depression during today's encounter. They screened negative with a PHQ-2 score of 0. Return in about 6 months (around 6/1/2024) for Recheck- 20 min lab review. Chief Complaint:     Chief Complaint   Patient presents with    Annual Exam      History of Present Illness:     Adult Annual Physical   Patient here for a comprehensive physical exam. The patient reports problems - patient has chronic history of anxiety and left shoulder pain. The patient has been doing well on Prozac 40 mg daily. He would like to continue on this medication for a keep his mood stable. Additionally he has a history of increased pain in the left shoulder. He was initially evaluated on 3/2023 by me, and previously was evaluated by other provider in 2019. Since he has been having continued symptoms of discomfort and due profession as a  he would like this further evaluated. .    I personally reviewed the patient's most recent lab work. He has a history of hyperlipidemia and elevated fasting glucose. His glucose remains elevated but his cholesterol has significantly improved. His LDL is only mildly elevated at 103. Diet and Physical Activity  Diet/Nutrition: well balanced diet. Exercise: no formal exercise. Depression Screening  PHQ-2/9 Depression Screening    Little interest or pleasure in doing things: 0 - not at all  Feeling down, depressed, or hopeless: 0 - not at all  PHQ-2 Score: 0  PHQ-2 Interpretation: Negative depression screen       General Health  Sleep:  About 6 hours nightly. Feels refreshed upon waking. No significant snoring or gasping. Luz Elena Khan    Hearing: normal - bilateral.  Vision: goes for regular eye exams and wears contacts. Dental: no dental visits for >1 year and brushes teeth twice daily.  Health  Symptoms include: none    Advanced Care Planning  Do you have an advanced directive? no  Do you have a durable medical power of ? no     Review of Systems:     Review of Systems   Constitutional:  Negative for chills, fever and unexpected weight change. HENT:  Positive for congestion and rhinorrhea. Negative for ear discharge, ear pain, postnasal drip, sinus pressure, sinus pain and sore throat. Eyes:  Positive for visual disturbance (wear contacts). Respiratory:  Negative for cough, chest tightness and shortness of breath. Cardiovascular:  Negative for chest pain and palpitations. Gastrointestinal:  Negative for abdominal pain, blood in stool, constipation, diarrhea, nausea and vomiting. Genitourinary:  Negative for dysuria and frequency. Skin:  Negative for rash and wound. Neurological:  Negative for dizziness, light-headedness and headaches. Psychiatric/Behavioral:  Negative for self-injury and suicidal ideas.        Past Medical History:     Past Medical History:   Diagnosis Date    Anxiety     Conjunctivitis of left eye 2/21/2018    Puncture wound 9/6/2022      Past Surgical History:     Past Surgical History:   Procedure Laterality Date    TONSILLECTOMY        Family History:     Family History   Problem Relation Age of Onset    Hypertension Mother     Multiple sclerosis Father     No Known Problems Sister     Substance Abuse Brother     No Known Problems Brother     Lung cancer Maternal Grandmother     Hypertension Maternal Grandmother     Lung cancer Maternal Grandfather     COPD Maternal Grandfather     Colon cancer Maternal Grandfather     COPD Paternal Grandmother     No Known Problems Maternal Aunt     No Known Problems Maternal Uncle     No Known Problems Paternal Aunt     No Known Problems Paternal Uncle     Prostate cancer Neg Hx     Testicular cancer Neg Hx       Social History:     Social History     Socioeconomic History    Marital status: /Civil Union     Spouse name: None    Number of children: None    Years of education: None    Highest education level: None   Occupational History    None   Tobacco Use    Smoking status: Never    Smokeless tobacco: Current     Types: Chew   Vaping Use    Vaping Use: Never used   Substance and Sexual Activity    Alcohol use: Not Currently     Comment: recently none    Drug use: No    Sexual activity: Yes     Partners: Female     Birth control/protection: OCP   Other Topics Concern    None   Social History Narrative    None     Social Determinants of Health     Financial Resource Strain: Not on file   Food Insecurity: Not on file   Transportation Needs: Not on file   Physical Activity: Not on file   Stress: Not on file   Social Connections: Not on file   Intimate Partner Violence: Not on file   Housing Stability: Not on file      Current Medications:     Current Outpatient Medications   Medication Sig Dispense Refill    FLUoxetine (PROzac) 40 MG capsule Take 1 capsule (40 mg total) by mouth daily 90 capsule 0     No current facility-administered medications for this visit. Allergies:     No Known Allergies   Physical Exam:     /70   Pulse 90   Resp 16   Ht 6' 4" (1.93 m)   Wt 104 kg (230 lb)   SpO2 97%   BMI 28.00 kg/m²     Physical Exam  Constitutional:       General: He is not in acute distress. Appearance: Normal appearance. He is normal weight. He is not ill-appearing, toxic-appearing or diaphoretic. HENT:      Head: Normocephalic and atraumatic. Right Ear: Tympanic membrane, ear canal and external ear normal. There is no impacted cerumen. Left Ear: Tympanic membrane, ear canal and external ear normal. There is no impacted cerumen. Nose: Nose normal. No congestion or rhinorrhea.       Mouth/Throat:      Mouth: Mucous membranes are moist. Pharynx: Oropharynx is clear. No oropharyngeal exudate or posterior oropharyngeal erythema. Eyes:      General:         Right eye: No discharge. Left eye: No discharge. Conjunctiva/sclera: Conjunctivae normal.      Pupils: Pupils are equal, round, and reactive to light. Cardiovascular:      Rate and Rhythm: Normal rate and regular rhythm. Heart sounds: Normal heart sounds. No murmur heard. No friction rub. No gallop. Pulmonary:      Effort: Pulmonary effort is normal. No respiratory distress. Breath sounds: Normal breath sounds. No stridor. No wheezing, rhonchi or rales. Abdominal:      General: Bowel sounds are normal. There is no distension. Palpations: Abdomen is soft. Tenderness: There is no abdominal tenderness. Musculoskeletal:      Cervical back: Neck supple. No tenderness. Lymphadenopathy:      Cervical: No cervical adenopathy. Skin:     General: Skin is warm. Capillary Refill: Capillary refill takes less than 2 seconds. Neurological:      Mental Status: He is alert. Sensory: No sensory deficit (Light touch present in all 4 extremities, no numbness and tingling in upper extremities. ). Motor: No weakness (5/5 in all 4 extremities).           [facility], [facility]

## 2024-01-05 ENCOUNTER — TELEPHONE (OUTPATIENT)
Dept: OTHER | Age: 49
End: 2024-01-05

## 2024-01-05 NOTE — TELEPHONE ENCOUNTER
Received a new pt referral to the [facility] from Maximiliano via the [employer] web site.  Called and spoke to Maximiliano who states that he is seeking help for his alcohol drinking, as he states that he 2-3 times per year goes on drinking binges and it affects his life.    Maximiliano last drank alcohol over New Year's weekend, 3 -4 days straight.  He has no hearing impairment nor any language barriers.  He has a psychiatric diagnosis of Anxiety.  He feels safe, has no current SI/HI, nor any community treatment team.    Insurance and residence verified.  Scheduled him with Rhea TORRES CM, at 8 am on 1/11/24.    For your review.

## 2024-01-11 ENCOUNTER — DOCUMENTATION (OUTPATIENT)
Dept: PSYCHIATRY | Facility: CLINIC | Age: 49
End: 2024-01-11

## 2024-01-16 NOTE — PROGRESS NOTES
BIOPSYCHOSOCIAL ASSESSMENT    Client Name:Maximiliano Perez  :1975    Was Insurance Verified: Yes, describe: [employer]    Date of Service: 2024       Current Hospital Admission (if applicable): N/a   - Date of admission:   - Anticipated Discharge Date:     Presenting Problem (reason for referral): Referral via [employer]   - Individuals' perception of the problem: Patient struggles with binge drinking, last drink last night     Individual/Family's Expectations: would like assistance in starting with MAT Maintenance     Current Living Situation:    - Issues with current living situation? No   - Able to return? yes    Additional Comments:    Family, Social and Sexual Information:    - Marital Status:   - Dependant Children? Yes 1 Son 2 Daughters    - Describe primary support system: Wife   Family support/recovery support system: Yes     - Sexual orientation: Heterosexual/straight   - Gender Identity: Male    Any gender identity issues? None Identified      - Self-history of sexual or physical abuse? No    Academic/Vocational:   - Currently in school? No    Current School name (if applicable): N/a    Degree to which substance use interfered with education: N/a      Highest education level: High School with College     - Current occupation/vocation:Owner of Construction company    Status: currently employed    Hours worked per week: 40 +    Degree to which substance use has interfered with employment: Does understand the stress of work may cause an increase in drinking       Other/all forms of income: none identified      -  service: no    (If YES, answer the following:)  Branch:     Status:   Eligible for VA Benefits?     Combat Trauma?     Injuries related to  service?     Cultural and Ethnic Background:   - Describe any cultural and/or ethnical beliefs that may impact care: None identified    - Primary [employer]: Other: Unknown to this CM     Legal History:   -Current charges  pending (or within the past 5 years)? No    If yes, please explain: N/a   - Probation/[place]? no    (If YES, answer the following:)  Probation/:    Probation/ contact information:     Court Mandated treatment? no    Substance Use History:     Substance Type  Age of Onset Method of Use Average use and Frequency When Last Used Last Amount Used   Alcohol yes Age 17 oral Binge drinking yesterday 2 beers   Amphetamines no        Methamphetamines no        Benzodiazepines no        Cannabis no        Cocaine/Crack Cocaine yes Early        Hallucinogens no        Heroin/Opiates no        Fentanyl no        Xylazine no        Ecstasy no        Nicotine no        Other no           - AA/NA: Npt currently involved     Sponsor (if applicable): N/a     - History of previous time in recovery/abstinence: 7 years    How was this achieved? Active in the gym     What caused recurrence of use? Attempt with social drinking      - History of Detoxification or Rehabilitation? No      - History of Substance use in Family? Yes, describe: mother, father, 2 brothers      - History of Addictive Behavior? No    (I.e., Gambling, Sexual, Internet, Other.)    Physical/Mental Health:    Current Physical Diagnosis: None    Mental Health Diagnosis: Anxiety     History of Mental Health? None    Current Allergies: None  Allergies to medication: None    Current Medications: None    (If current admission to hospital, please identify anticipated medication list upon hospital discharge):    Symptom Check List:    Depression: No     Deepali: No    Anxiety/Panic/Phobia: Yes, describe: Dx of Anxiety     Eating Disorder: no    Post-Traumatic Stress Disorder (PTSD): No    Obsessive/Compulsive: Denies    Thought Process: Denies    Impulsive Behavior: No    Risk Assessment:   - History of Violence? No   - Danger to self? No    Preoccupation with death? no  Suicidal Ideation? No  History of suicide attempt? no  Current threat?  no  Current Plan? no  Method? No  - Danger to others? No   Homicidal Ideation? no   History of threats? no   Attempts? no    IMMEDIATE ACCESS TO FIREARMS? no   If yes, Please address accordingly.    Protective Factors   - Family involvement? yes   - Sense of hope? yes   - Strong Social Support? yes    SNAP   Strengths - What are some things that will help you in treatment? Support from family (parents, children, others), Permanent residence, Connection to a mental health facility and/or psychiatric care, and Supportive friends   Needs - What do you want to learn in treatment? Emotion-management skills and Education about improving my health  Abilities - What are some of your personal qualities, skills or talents that will help you in treatment? I have a good self-esteem, I have some positive plans and goals for my future, I am willing to do whatever it takes to be in recovery, and I am a caring person, capable of offering support to others in recovery  Preferences - None Identified    Clinical Summary/Recommendation:

## 2024-01-22 ENCOUNTER — HOSPITAL ENCOUNTER (EMERGENCY)
Facility: HOSPITAL | Age: 49
Discharge: HOME | End: 2024-01-23
Attending: EMERGENCY MEDICINE | Admitting: EMERGENCY MEDICINE
Payer: COMMERCIAL

## 2024-01-22 DIAGNOSIS — F10.10 ALCOHOL ABUSE: Primary | ICD-10-CM

## 2024-01-22 LAB
ALBUMIN SERPL BCP-MCNC: 4.9 G/DL (ref 3.5–5)
ALP SERPL-CCNC: 63 U/L (ref 34–104)
ALT SERPL W P-5'-P-CCNC: 22 U/L (ref 7–52)
AMPHETAMINES SERPL QL SCN: NEGATIVE
ANION GAP SERPL CALCULATED.3IONS-SCNC: 10 MMOL/L
ANISOCYTOSIS BLD QL SMEAR: PRESENT
AST SERPL W P-5'-P-CCNC: 22 U/L (ref 13–39)
BARBITURATES UR QL: NEGATIVE
BASOPHILS # BLD MANUAL: 0 THOUSAND/UL (ref 0–0.1)
BASOPHILS NFR MAR MANUAL: 0 % (ref 0–1)
BENZODIAZ UR QL: NEGATIVE
BILIRUB SERPL-MCNC: 0.64 MG/DL (ref 0.2–1)
BILIRUB UR QL STRIP: NEGATIVE
BUN SERPL-MCNC: 14 MG/DL (ref 5–25)
CALCIUM SERPL-MCNC: 9 MG/DL (ref 8.4–10.2)
CHLORIDE SERPL-SCNC: 104 MMOL/L (ref 96–108)
CLARITY UR: CLEAR
CO2 SERPL-SCNC: 26 MMOL/L (ref 21–32)
COCAINE UR QL: NEGATIVE
COLOR UR: COLORLESS
CREAT SERPL-MCNC: 0.91 MG/DL (ref 0.6–1.3)
EOSINOPHIL # BLD MANUAL: 0 THOUSAND/UL (ref 0–0.4)
EOSINOPHIL NFR BLD MANUAL: 0 % (ref 0–6)
ERYTHROCYTE [DISTWIDTH] IN BLOOD BY AUTOMATED COUNT: 12.6 % (ref 11.6–15.1)
ETHANOL EXG-MCNC: 2.18 MG/DL
GFR SERPL CREATININE-BSD FRML MDRD: 100 ML/MIN/1.73SQ M
GLUCOSE SERPL-MCNC: 97 MG/DL (ref 65–140)
GLUCOSE UR STRIP-MCNC: NEGATIVE MG/DL
HCT VFR BLD AUTO: 45.9 % (ref 36.5–49.3)
HGB BLD-MCNC: 15.5 G/DL (ref 12–17)
HGB UR QL STRIP.AUTO: NEGATIVE
KETONES UR STRIP-MCNC: NEGATIVE MG/DL
LEUKOCYTE ESTERASE UR QL STRIP: NEGATIVE
LG PLATELETS BLD QL SMEAR: PRESENT
LYMPHOCYTES # BLD AUTO: 1.88 THOUSAND/UL (ref 0.6–4.47)
LYMPHOCYTES # BLD AUTO: 29 % (ref 14–44)
MCH RBC QN AUTO: 30.4 PG (ref 26.8–34.3)
MCHC RBC AUTO-ENTMCNC: 33.8 G/DL (ref 31.4–37.4)
MCV RBC AUTO: 90 FL (ref 82–98)
METAMYELOCYTES NFR BLD MANUAL: 1 % (ref 0–1)
METHADONE UR QL: NEGATIVE
MONOCYTES # BLD AUTO: 0.32 THOUSAND/UL (ref 0–1.22)
MONOCYTES NFR BLD: 5 % (ref 4–12)
NEUTROPHILS # BLD MANUAL: 4.22 THOUSAND/UL (ref 1.85–7.62)
NEUTS SEG NFR BLD AUTO: 65 % (ref 43–75)
NITRITE UR QL STRIP: NEGATIVE
OPIATES UR QL SCN: NEGATIVE
OXYCODONE+OXYMORPHONE UR QL SCN: NEGATIVE
PCP UR QL: NEGATIVE
PH UR STRIP.AUTO: 5.5 [PH]
PLATELET # BLD AUTO: 272 THOUSANDS/UL (ref 149–390)
PLATELET BLD QL SMEAR: ADEQUATE
PMV BLD AUTO: 9.9 FL (ref 8.9–12.7)
POIKILOCYTOSIS BLD QL SMEAR: PRESENT
POTASSIUM SERPL-SCNC: 4.3 MMOL/L (ref 3.5–5.3)
PROT SERPL-MCNC: 7.5 G/DL (ref 6.4–8.4)
PROT UR STRIP-MCNC: NEGATIVE MG/DL
RBC # BLD AUTO: 5.1 MILLION/UL (ref 3.88–5.62)
RBC MORPH BLD: PRESENT
SODIUM SERPL-SCNC: 140 MMOL/L (ref 135–147)
SP GR UR STRIP.AUTO: 1.01 (ref 1–1.03)
THC UR QL: NEGATIVE
UROBILINOGEN UR STRIP-ACNC: <2 MG/DL
WBC # BLD AUTO: 6.49 THOUSAND/UL (ref 4.31–10.16)

## 2024-01-22 PROCEDURE — 99285 EMERGENCY DEPT VISIT HI MDM: CPT | Performed by: EMERGENCY MEDICINE

## 2024-01-22 PROCEDURE — 80053 COMPREHEN METABOLIC PANEL: CPT

## 2024-01-22 PROCEDURE — 36415 COLL VENOUS BLD VENIPUNCTURE: CPT

## 2024-01-22 PROCEDURE — 85027 COMPLETE CBC AUTOMATED: CPT

## 2024-01-22 PROCEDURE — 80307 DRUG TEST PRSMV CHEM ANLYZR: CPT | Performed by: EMERGENCY MEDICINE

## 2024-01-22 PROCEDURE — 99284 EMERGENCY DEPT VISIT MOD MDM: CPT

## 2024-01-22 PROCEDURE — 85007 BL SMEAR W/DIFF WBC COUNT: CPT

## 2024-01-22 PROCEDURE — 81003 URINALYSIS AUTO W/O SCOPE: CPT | Performed by: EMERGENCY MEDICINE

## 2024-01-22 PROCEDURE — 82075 ASSAY OF BREATH ETHANOL: CPT | Performed by: EMERGENCY MEDICINE

## 2024-01-23 VITALS
OXYGEN SATURATION: 97 % | SYSTOLIC BLOOD PRESSURE: 171 MMHG | DIASTOLIC BLOOD PRESSURE: 91 MMHG | TEMPERATURE: 98.6 F | RESPIRATION RATE: 17 BRPM | HEART RATE: 101 BPM

## 2024-01-23 RX ORDER — LORAZEPAM 1 MG/1
2 TABLET ORAL ONCE
Status: COMPLETED | OUTPATIENT
Start: 2024-01-23 | End: 2024-01-23

## 2024-01-23 RX ADMIN — LORAZEPAM 2 MG: 1 TABLET ORAL at 07:43

## 2024-01-23 NOTE — ED ATTENDING ATTESTATION
1/22/2024  I, Marbella Steven MD, saw and evaluated the patient. I have discussed the patient with the resident/non-physician practitioner and agree with the resident's/non-physician practitioner's findings, Plan of Care, and MDM as documented in the resident's/non-physician practitioner's note, except where noted. All available labs and Radiology studies were reviewed.  I was present for key portions of any procedure(s) performed by the resident/non-physician practitioner and I was immediately available to provide assistance.       At this point I agree with the current assessment done in the Emergency Department.  I have conducted an independent evaluation of this patient a history and physical is as follows:    49 yo male with h/o heavy ETOH use presents with anxiety and stress over work at behest of wife.  NO SI/HI/AH/VH.  Denies depression.  Does admit to drinking since last Thursday, beer/liquor, last drink an hour ago.  He recognizes he has an issue with ETOH use.  Reports intermittent binge drinking (may go through 2 cases of beer and 2 pints of liquor over the course of a weekend).  Denies h/o w/d or Dts.   Denies other illicit substances.  Is amenable to help with drinking and detox.      On exam pt well appearing, mildly intoxicated, exam non-focal.  No pain complaints or obvious trauma.  BAT 2.18.  Will reach out to [facility], obtain labs given prolonged h/o heavy ETOH use and monitor for sobriety/withdrawal.      Signed out to overnight team pending [facility] eval.  HD stable. No SI/HI/AH/VH or indications for 302.       ED Course  ED Course as of 01/26/24 2149 Mon Jan 22, 2024 2011 EXTBreath Alcohol: 2.18  elevated   2017 CBC and differential  No significant leukocytosis reassuring diff, normal H/H, normal platelets.      2022 UA w Reflex to Microscopic w Reflex to Culture  Reassuring, no end organ damage, no AG, normal bicarb.       2028 Rapid drug screen, urine  wnl   2056 Comprehensive metabolic  panel  Reassuring, no end organ damage, no AG, normal bicarb.       2104 Manual Differential(PHLEBS Do Not Order)  wnl         Critical Care Time  Procedures

## 2024-01-23 NOTE — ED PROVIDER NOTES
"History  Chief Complaint   Patient presents with    Anxiety     Pt coming from home, reporting feeling \"over stressed\". Family states aggressive behavior, cooperative per EMS, patient denies any behavior like that. Denies SI/HI. Pt reporting alcohol consumption of 12 beers.      HPI  Patient is a 48-year-old male with no pertinent past medical history.  He is presenting today with complaints of anxiety.  He states he has been dealing with stress that is unbearable due to work.  He adds that he has anxiety about \"everything in my life.\"  He also endorses having a problem with alcohol use.  He endorses drinking 12 beers today.  He stated that he typically drinks 2 cases of beer and 2 pints of liquor over the weekend.  He states that his wife wanted him to come to the hospital due to his drinking and his anxiety.  He denies all other symptoms.    Prior to Admission Medications   Prescriptions Last Dose Informant Patient Reported? Taking?   FLUoxetine (PROzac) 40 MG capsule   No No   Sig: Take 1 capsule (40 mg total) by mouth daily      Facility-Administered Medications: None       Past Medical History:   Diagnosis Date    Anxiety     Conjunctivitis of left eye 2/21/2018    Puncture wound 9/6/2022       Past Surgical History:   Procedure Laterality Date    TONSILLECTOMY         Family History   Problem Relation Age of Onset    Hypertension Mother     Multiple sclerosis Father     No Known Problems Sister     Substance Abuse Brother     No Known Problems Brother     Lung cancer Maternal Grandmother     Hypertension Maternal Grandmother     Lung cancer Maternal Grandfather     COPD Maternal Grandfather     Colon cancer Maternal Grandfather     COPD Paternal Grandmother     No Known Problems Maternal Aunt     No Known Problems Maternal Uncle     No Known Problems Paternal Aunt     No Known Problems Paternal Uncle     Prostate cancer Neg Hx     Testicular cancer Neg Hx      I have reviewed and agree with the history as " documented.    E-Cigarette/Vaping    E-Cigarette Use Never User      E-Cigarette/Vaping Substances     Social History     Tobacco Use    Smoking status: Never    Smokeless tobacco: Current     Types: Chew   Vaping Use    Vaping status: Never Used   Substance Use Topics    Alcohol use: Not Currently     Comment: recently none    Drug use: No        Review of Systems   Constitutional:  Negative for chills, fatigue and fever.   HENT:  Negative for congestion, rhinorrhea and sore throat.    Respiratory:  Negative for shortness of breath.    Cardiovascular:  Negative for chest pain and leg swelling.   Gastrointestinal:  Negative for abdominal pain, constipation, diarrhea, nausea and vomiting.   Musculoskeletal:  Negative for arthralgias and myalgias.   Neurological:  Negative for seizures, syncope, weakness, light-headedness and headaches.   Psychiatric/Behavioral:  The patient is nervous/anxious.        Physical Exam  ED Triage Vitals [01/22/24 1857]   Temperature Pulse Respirations Blood Pressure SpO2   98.6 °F (37 °C) 92 20 149/85 96 %      Temp Source Heart Rate Source Patient Position - Orthostatic VS BP Location FiO2 (%)   Oral Monitor Sitting Right arm --      Pain Score       No Pain             Orthostatic Vital Signs  Vitals:    01/22/24 1857   BP: 149/85   Pulse: 92   Patient Position - Orthostatic VS: Sitting       Physical Exam  HENT:      Head: Normocephalic and atraumatic.   Eyes:      Extraocular Movements: Extraocular movements intact.      Pupils: Pupils are equal, round, and reactive to light.   Cardiovascular:      Rate and Rhythm: Normal rate and regular rhythm.      Pulses: Normal pulses.      Heart sounds: Normal heart sounds.   Pulmonary:      Effort: Pulmonary effort is normal.      Breath sounds: Normal breath sounds.   Abdominal:      General: Bowel sounds are normal.      Palpations: Abdomen is soft.      Tenderness: There is no abdominal tenderness.   Neurological:      General: No focal  deficit present.      Mental Status: He is alert and oriented to person, place, and time.   Psychiatric:         Mood and Affect: Mood is anxious.         ED Medications  Medications - No data to display    Diagnostic Studies  Results Reviewed       Procedure Component Value Units Date/Time    POCT alcohol breath test [[id number]]  (Abnormal) Resulted: 01/22/24 2007    Lab Status: Edited Result - FINAL Updated: 01/22/24 2008     EXTBreath Alcohol 2.18    CBC and differential [[id number]] Collected: 01/22/24 2006    Lab Status: No result Specimen: Blood from Arm, Right     Comprehensive metabolic panel [[id number]] Collected: 01/22/24 2006    Lab Status: No result Specimen: Blood from Arm, Right     Rapid drug screen, urine [[id number]] Collected: 01/22/24 2001    Lab Status: No result Specimen: Urine, Clean Catch     UA w Reflex to Microscopic w Reflex to Culture [[id number]] Collected: 01/22/24 2001    Lab Status: No result Specimen: Urine, Clean Catch                    No orders to display         Procedures  Procedures      ED Course  ED Course as of 01/22/24 2359 Mon Jan 22, 2024 2113 HOST spoken with. Will see patient soon.    2123 POCT alcohol breath test(!)   2318 CATCH spoke with pt's wife and is coordinating a rehab for him to go to. They have been unable to contact the pt due to him not having his cell phone. Pt is in agreement                                        Medical Decision Making  Amount and/or Complexity of Data Reviewed  Labs: ordered. Decision-making details documented in ED Course.          Disposition  Final diagnoses:   None     ED Disposition       None          Follow-up Information    None         Patient's Medications   Discharge Prescriptions    No medications on file     No discharge procedures on file.    PDMP Review       None             ED Provider  Attending physically available and evaluated Maximiliano Perez. I managed the patient along with the ED Attending.    Electronically  Signed by  Albert Bradley DO  PGY-1           Albert Bradley DO  01/23/24 0001

## 2024-03-08 ENCOUNTER — OFFICE VISIT (OUTPATIENT)
Dept: FAMILY MEDICINE CLINIC | Facility: CLINIC | Age: 49
End: 2024-03-08
Payer: COMMERCIAL

## 2024-03-08 VITALS
OXYGEN SATURATION: 97 % | HEART RATE: 70 BPM | BODY MASS INDEX: 29.35 KG/M2 | SYSTOLIC BLOOD PRESSURE: 122 MMHG | HEIGHT: 76 IN | WEIGHT: 241 LBS | DIASTOLIC BLOOD PRESSURE: 72 MMHG

## 2024-03-08 DIAGNOSIS — F10.10 ALCOHOL ABUSE: Primary | ICD-10-CM

## 2024-03-08 DIAGNOSIS — Z12.11 COLON CANCER SCREENING: ICD-10-CM

## 2024-03-08 DIAGNOSIS — F41.9 ANXIETY: ICD-10-CM

## 2024-03-08 PROCEDURE — 99213 OFFICE O/P EST LOW 20 MIN: CPT | Performed by: FAMILY MEDICINE

## 2024-03-08 RX ORDER — ARIPIPRAZOLE 5 MG/1
TABLET ORAL
COMMUNITY
Start: 2024-02-12 | End: 2024-03-08 | Stop reason: SDUPTHER

## 2024-03-08 RX ORDER — FLUOXETINE HYDROCHLORIDE 20 MG/1
20 CAPSULE ORAL DAILY
Qty: 90 CAPSULE | Refills: 1 | Status: SHIPPED | OUTPATIENT
Start: 2024-03-08

## 2024-03-08 RX ORDER — ARIPIPRAZOLE 5 MG/1
5 TABLET ORAL DAILY
Qty: 90 TABLET | Refills: 1 | Status: SHIPPED | OUTPATIENT
Start: 2024-03-08

## 2024-03-08 RX ORDER — FLUOXETINE HYDROCHLORIDE 40 MG/1
40 CAPSULE ORAL DAILY
Qty: 90 CAPSULE | Refills: 1 | Status: SHIPPED | OUTPATIENT
Start: 2024-03-08

## 2024-03-08 NOTE — PATIENT INSTRUCTIONS
For colonoscopy I recommend to the 3 providers-Dr. Ta, Dr. Ward, and Dr. Galarza    Please continue with your current dosage of medication.  I appreciate you coming in to discuss this and update me.  I have given you a prolonged prescription of 90 days with a refill.    He is continue to abstain from alcohol the best you can.  Whether that is through activity, AA meetings, or other opportunities to remain sober please try and figure out a way best for you.

## 2024-03-08 NOTE — PROGRESS NOTES
Outpatient Note- Follow up     HPI:     Maximiliano Perez , 48 y.o. male  presents today for follow-up for alcohol abuse.  He was recently in the emergency room and followed up at an rehab facility for 35 days.  The patient did have some changes to his medication regimen, they started him on Abilify 5 mg daily and increase Prozac from 40 mg to 60 mg.  He has been doing well and has not had any relapse of his alcoholism.  They did try him on naltrexone in the rehab facility, unfortunately had significant vertigo.  He has been doing well on his current medication regimen and is interested in continuation.  He needs refills.    We also had a brief discussion about Cologuard versus full colonoscopy.  He is concerned about the possible false positives that is with Cologuard and therefore requested we place a full colonoscopy referral to GI.  He denies any chest pain, shortness of breath, nausea, vomiting, lightheadedness, dizziness, diarrhea, constipation.    Past Medical History:   Diagnosis Date    Anxiety     Conjunctivitis of left eye 2/21/2018    Puncture wound 9/6/2022      ROS:   Review of Systems   See HPI    OBJECTIVE  Vitals:    03/08/24 0818   BP: 122/72   Pulse: 70   SpO2: 97%        Physical Exam  Constitutional:       General: He is not in acute distress.     Appearance: Normal appearance. He is normal weight. He is not ill-appearing, toxic-appearing or diaphoretic.   HENT:      Head: Normocephalic and atraumatic.      Right Ear: Tympanic membrane, ear canal and external ear normal. There is no impacted cerumen.      Left Ear: Tympanic membrane, ear canal and external ear normal. There is no impacted cerumen.      Nose: Nose normal. No congestion or rhinorrhea.      Mouth/Throat:      Mouth: Mucous membranes are moist.      Pharynx: Oropharynx is clear. No oropharyngeal exudate or posterior oropharyngeal erythema.   Eyes:      General:         Right eye: No discharge.         Left eye: No discharge.       Pupils: Pupils are equal, round, and reactive to light.   Cardiovascular:      Rate and Rhythm: Normal rate and regular rhythm.      Heart sounds: No murmur heard.     No friction rub. No gallop.   Pulmonary:      Effort: Pulmonary effort is normal. No respiratory distress.      Breath sounds: Normal breath sounds. No stridor. No wheezing, rhonchi or rales.   Abdominal:      General: Bowel sounds are normal. There is no distension.      Palpations: Abdomen is soft.      Tenderness: There is no abdominal tenderness.   Neurological:      Mental Status: He is alert.          ASSESSMENT AND PLAN   Maximiliano was seen today for follow-up  Diagnoses and all orders for this visit:    Alcohol abuse  Anxiety  Patient has history of alcohol abuse and anxiety.  Thankfully he was able to attend rehab.  He has had changes to his medication and wanted to follow-up with us in the office.  I have per prescribed the appropriate amounts of medication including Prozac 60 mg with the 20 mg and a 40 mg capsule.  Abilify is at 5 mg.  6 months worth of the medication was sent to the pharmacy, but he should follow-up if he has any cravings or increased anxiety.  -     FLUoxetine (PROzac) 20 mg capsule; Take 1 capsule (20 mg total) by mouth daily  -     FLUoxetine (PROzac) 40 MG capsule; Take 1 capsule (40 mg total) by mouth daily  -     ARIPiprazole (ABILIFY) 5 mg tablet; Take 1 tablet (5 mg total) by mouth daily    Colon cancer screening  Patient quires referral for colon cancer screening.  He is greater than 45 at this time.  He is interested in a full colonoscopy.  GI referral placed.  -     Ambulatory Referral to Gastroenterology; Future       DO Aura Cali [facility]  3/8/2024 8:53 AM

## 2024-03-16 ENCOUNTER — HOSPITAL ENCOUNTER (EMERGENCY)
Facility: HOSPITAL | Age: 49
Discharge: HOME | End: 2024-03-16
Attending: EMERGENCY MEDICINE | Admitting: EMERGENCY MEDICINE
Payer: COMMERCIAL

## 2024-03-16 ENCOUNTER — APPOINTMENT (EMERGENCY)
Dept: RADIOLOGY | Facility: HOSPITAL | Age: 49
End: 2024-03-16
Payer: COMMERCIAL

## 2024-03-16 VITALS
SYSTOLIC BLOOD PRESSURE: 171 MMHG | TEMPERATURE: 98.2 F | DIASTOLIC BLOOD PRESSURE: 103 MMHG | HEART RATE: 65 BPM | OXYGEN SATURATION: 98 % | RESPIRATION RATE: 18 BRPM

## 2024-03-16 DIAGNOSIS — M54.10 RADICULOPATHY: ICD-10-CM

## 2024-03-16 DIAGNOSIS — S46.912A LEFT SHOULDER STRAIN: Primary | ICD-10-CM

## 2024-03-16 LAB
ATRIAL RATE: 75 BPM
P AXIS: 76 DEGREES
PR INTERVAL: 132 MS
QRS AXIS: 80 DEGREES
QRSD INTERVAL: 74 MS
QT INTERVAL: 386 MS
QTC INTERVAL: 425 MS
T WAVE AXIS: 62 DEGREES
VENTRICULAR RATE: 73 BPM

## 2024-03-16 PROCEDURE — 99284 EMERGENCY DEPT VISIT MOD MDM: CPT | Performed by: EMERGENCY MEDICINE

## 2024-03-16 PROCEDURE — 93005 ELECTROCARDIOGRAM TRACING: CPT

## 2024-03-16 PROCEDURE — 96372 THER/PROPH/DIAG INJ SC/IM: CPT

## 2024-03-16 PROCEDURE — 73030 X-RAY EXAM OF SHOULDER: CPT

## 2024-03-16 PROCEDURE — 99285 EMERGENCY DEPT VISIT HI MDM: CPT

## 2024-03-16 RX ORDER — METHOCARBAMOL 500 MG/1
500 TABLET, FILM COATED ORAL 2 TIMES DAILY PRN
Qty: 14 TABLET | Refills: 0 | Status: SHIPPED | OUTPATIENT
Start: 2024-03-16 | End: 2024-03-23

## 2024-03-16 RX ORDER — KETOROLAC TROMETHAMINE 30 MG/ML
15 INJECTION, SOLUTION INTRAMUSCULAR; INTRAVENOUS ONCE
Status: COMPLETED | OUTPATIENT
Start: 2024-03-16 | End: 2024-03-16

## 2024-03-16 RX ORDER — PREDNISONE 20 MG/1
40 TABLET ORAL ONCE
Status: COMPLETED | OUTPATIENT
Start: 2024-03-16 | End: 2024-03-16

## 2024-03-16 RX ORDER — PREDNISONE 20 MG/1
40 TABLET ORAL DAILY
Qty: 8 TABLET | Refills: 0 | Status: SHIPPED | OUTPATIENT
Start: 2024-03-16 | End: 2024-03-20

## 2024-03-16 RX ORDER — KETOROLAC TROMETHAMINE 30 MG/ML
15 INJECTION, SOLUTION INTRAMUSCULAR; INTRAVENOUS ONCE
Status: DISCONTINUED | OUTPATIENT
Start: 2024-03-16 | End: 2024-03-16

## 2024-03-16 RX ADMIN — KETOROLAC TROMETHAMINE 15 MG: 30 INJECTION, SOLUTION INTRAMUSCULAR; INTRAVENOUS at 14:46

## 2024-03-16 RX ADMIN — PREDNISONE 40 MG: 20 TABLET ORAL at 14:48

## 2024-03-16 NOTE — Clinical Note
Maximiliano Perez was seen and treated in our emergency department on 3/16/2024.                Diagnosis:     Maximiliano  .    He may return on this date: 03/22/2024    Please allow for light duties.     If you have any questions or concerns, please don't hesitate to call.      Janee Cristina MD    ______________________________           _______________          _______________  Hospital Representative                              Date                                Time

## 2024-03-16 NOTE — ED ATTENDING ATTESTATION
3/16/2024  I, Devon Robison MD, saw and evaluated the patient. I have discussed the patient with the resident/non-physician practitioner and agree with the resident's/non-physician practitioner's findings, Plan of Care, and MDM as documented in the resident's/non-physician practitioner's note, except where noted. All available labs and Radiology studies were reviewed.  I was present for key portions of any procedure(s) performed by the resident/non-physician practitioner and I was immediately available to provide assistance.       At this point I agree with the current assessment done in the Emergency Department.  I have conducted an independent evaluation of this patient a history and physical is as follows: Reproducible left shoulder pain.  Reports intermittent decree sensation in the medial aspect of his left upper extremity.  Denies any headache, neck pain.  Denies any chest pain.  Despite triage note, patient with no report of chest pain.  Patient works as a .  No falls or trauma.  2+ distal pulses.  Symptoms minimal at this time.  Reports that worsened in the evening with certain positions.  No chest pain, no shortness of breath.  Normal physical exam except for mildly decreased sensation over the inner aspect of left upper arm.  Suspect intermittent peripheral nerve impingement.  He has follow-up with orthopedics this coming Friday.  Symptom management, strict return precautions given.    No additional labs or imaging clinically indicated overall well-appearing male, no chest pain or shortness of breath, reproducible musculoskeletal left shoulder pain.     XR shoulder 2+ views LEFT   ED Interpretation by Devon Robison MD (03/16 9064)   No acute osseous abnormality identified.            ED Course         Critical Care Time  Procedures

## 2024-03-16 NOTE — ED PROVIDER NOTES
History  Chief Complaint   Patient presents with    Chest Pain     CP down left arm that happened 2 days ago. No injury noted but does do physical labor at job. No cardiac hx.      48-year-old male with past medical history of anxiety, Gilbert's syndrome presents for evaluation of left shoulder and arm pain x 2 days that he describes as a sharp, shooting pain starting from the L shoulder to the fingertips with occasional numbness in the fingertips that occurs several times throughout the day. He states moving his arm and bending his neck seems to trigger episodes at times. Denies any known strenuous activity, exercise or injuries prior to the onset of Sx, however works in constructions and lifts heavy materials often. Denies associated Sx CP, neck pain, back pain, SOB, dizziness/lightheadedness, HA, vision changes, focal weakness, N/V, rash, fever/chills or any other Sx. Pt has tried taking Ibuprofen since the onset of Sx without improvement. No other concerns.        Prior to Admission Medications   Prescriptions Last Dose Informant Patient Reported? Taking?   ARIPiprazole (ABILIFY) 5 mg tablet   No No   Sig: Take 1 tablet (5 mg total) by mouth daily   FLUoxetine (PROzac) 20 mg capsule   No No   Sig: Take 1 capsule (20 mg total) by mouth daily   FLUoxetine (PROzac) 40 MG capsule   No No   Sig: Take 1 capsule (40 mg total) by mouth daily      Facility-Administered Medications: None       Past Medical History:   Diagnosis Date    Anxiety     Conjunctivitis of left eye 2/21/2018    Puncture wound 9/6/2022       Past Surgical History:   Procedure Laterality Date    TONSILLECTOMY         Family History   Problem Relation Age of Onset    Hypertension Mother     Multiple sclerosis Father     No Known Problems Sister     Substance Abuse Brother     No Known Problems Brother     Lung cancer Maternal Grandmother     Hypertension Maternal Grandmother     Lung cancer Maternal Grandfather     COPD Maternal Grandfather     Colon  cancer Maternal Grandfather     COPD Paternal Grandmother     No Known Problems Maternal Aunt     No Known Problems Maternal Uncle     No Known Problems Paternal Aunt     No Known Problems Paternal Uncle     Prostate cancer Neg Hx     Testicular cancer Neg Hx      I have reviewed and agree with the history as documented.    E-Cigarette/Vaping    E-Cigarette Use Never User      E-Cigarette/Vaping Substances     Social History     Tobacco Use    Smoking status: Never    Smokeless tobacco: Former     Types: Chew   Vaping Use    Vaping status: Never Used   Substance Use Topics    Alcohol use: Not Currently     Comment: recently none    Drug use: No        Review of Systems   Constitutional:  Negative for chills and fever.   HENT:  Negative for ear pain and sore throat.    Eyes:  Negative for pain and visual disturbance.   Respiratory:  Negative for cough and shortness of breath.    Cardiovascular:  Negative for chest pain and palpitations.   Gastrointestinal:  Negative for abdominal pain, diarrhea, nausea and vomiting.   Genitourinary:  Negative for dysuria and hematuria.   Musculoskeletal:  Positive for arthralgias. Negative for back pain, neck pain and neck stiffness.   Skin:  Negative for color change and rash.   Neurological:  Positive for numbness. Negative for seizures and syncope.   All other systems reviewed and are negative.      Physical Exam  ED Triage Vitals   Temperature Pulse Respirations Blood Pressure SpO2   03/16/24 1446 03/16/24 1312 03/16/24 1312 03/16/24 1312 03/16/24 1312   98.2 °F (36.8 °C) 65 18 (!) 171/103 98 %      Temp Source Heart Rate Source Patient Position - Orthostatic VS BP Location FiO2 (%)   03/16/24 1446 03/16/24 1312 -- 03/16/24 1312 --   Oral Monitor  Right arm       Pain Score       --                    Orthostatic Vital Signs  Vitals:    03/16/24 1312   BP: (!) 171/103   Pulse: 65       Physical Exam  Vitals and nursing note reviewed.   Constitutional:       General: He is not in  acute distress.     Appearance: Normal appearance. He is well-developed. He is not ill-appearing, toxic-appearing or diaphoretic.   HENT:      Head: Normocephalic and atraumatic.      Right Ear: External ear normal.      Left Ear: External ear normal.      Nose: Nose normal.      Mouth/Throat:      Mouth: Mucous membranes are moist.   Eyes:      Extraocular Movements: Extraocular movements intact.      Conjunctiva/sclera: Conjunctivae normal.   Neck:      Comments: No midline or paraspinal C spine TTP. No deformities or step offs.  Cardiovascular:      Rate and Rhythm: Normal rate and regular rhythm.      Pulses: Normal pulses.      Heart sounds: Normal heart sounds. No murmur heard.  Pulmonary:      Effort: Pulmonary effort is normal. No respiratory distress.      Breath sounds: Normal breath sounds.   Chest:      Chest wall: No tenderness.   Abdominal:      Palpations: Abdomen is soft.      Tenderness: There is no abdominal tenderness. There is no guarding or rebound.   Musculoskeletal:         General: Tenderness present. No swelling or deformity. Normal range of motion.      Cervical back: Normal range of motion and neck supple. No rigidity or tenderness.      Right lower leg: No edema.      Left lower leg: No edema.      Comments: TTP over the anterior L shoulder with reproducible tenderness with ROM of the LUE at the L shoulder joint. Positive Neer's. Positive Hawking's. No TTP over the midline T/L/S spines. No TTP over BILAT scapula or posterior shoulders. No deformities of these areas.  L UE is neurovascularly intact with soft compartments. ROM of the L fingers intact with full strength in the distribution of the median, radial and ulnar nerves.   Skin:     General: Skin is warm and dry.      Capillary Refill: Capillary refill takes less than 2 seconds.   Neurological:      Mental Status: He is alert.   Psychiatric:         Mood and Affect: Mood normal.         ED Medications  Medications   ketorolac  (TORADOL) injection 15 mg (15 mg Intramuscular Given 3/16/24 1446)   predniSONE tablet 40 mg (40 mg Oral Given 3/16/24 1448)       Diagnostic Studies  Results Reviewed       None                   XR shoulder 2+ views LEFT   ED Interpretation by Devon Robison MD (03/16 1443)   No acute osseous abnormality identified.      Final Result by Avila El MD (03/17 0841)      No acute osseous abnormality.         Workstation performed: [id number]               Procedures  ECG 12 Lead Documentation Only    Date/Time: 3/16/2024 1:16 PM    Performed by: Janee Cristina MD  Authorized by: Janee Cristina MD    Indications / Diagnosis:  L shoulder pain  ECG reviewed by me, the ED Provider: yes    Patient location:  ED  Previous ECG:     Previous ECG:  Compared to current    Comparison ECG info:  4/13/2013    Similarity:  No change    Comparison to cardiac monitor: Yes    Interpretation:     Interpretation: normal    Rate:     ECG rate:  73    ECG rate assessment: normal    Rhythm:     Rhythm: sinus rhythm    Ectopy:     Ectopy: PAC    QRS:     QRS axis:  Normal    QRS intervals:  Normal  Conduction:     Conduction: normal    ST segments:     ST segments:  Normal  T waves:     T waves: normal          ED Course                                       Medical Decision Making  Patient remained stable throughout ED course.  Despite triage's note indicating that patient is presenting for evaluation of chest pain radiating to the left arm, patient adamantly denied experiencing any chest pain whatsoever on multiple occasions.  Given complete reproduction of symptoms with palpation and range of motion of left upper extremity at the shoulder, this is most likely a rotator cuff injury versus peripheral nerve impingement.  Very much doubt ACS today.  That was obtained per first nursing orders showed nonspecific PACs without any other acute ST changes, T wave abnormalities or any other concerning abnormalities.  X-ray of the left  shoulder did not reveal any significant arthritic changes or any other bony abnormalities.  Patient was provided her first dose of steroids for 5-day burst as well as Robaxin for management any component of muscle spasms which patient later on reported that he has also been experiencing in the left shoulder.  Patient was provided an ambulatory referral to neurology as well as the comprehensive spine clinic.  Patient has a standing appointment with orthopedic surgery in about 1 week.  Strict return to ER precautions discussed at length.  Stable for discharge home at this time. Pt understands and agreed with plan. All questions answered. No other concerns.        Amount and/or Complexity of Data Reviewed  Radiology: ordered and independent interpretation performed.    Risk  Prescription drug management.          Disposition  Final diagnoses:   Left shoulder strain   Radiculopathy     Time reflects when diagnosis was documented in both MDM as applicable and the Disposition within this note       Time User Action Codes Description Comment    3/16/2024  2:37 PM Janee Cristina [S46.912A] Left shoulder strain     3/16/2024  2:37 PM Janee Cristina [M54.10] Radiculopathy           ED Disposition       ED Disposition   Discharge    Condition   Stable    Date/Time   Sat Mar 16, 2024  2:37 PM    Comment   Maximiliano Perez discharge to home/self care.                   Follow-up Information       Follow up With Specialties Details Why Contact Info Additional Information    Nader Plascencia, DO Family Medicine Call  As needed [address] [facility] [address] [facility]  [address]  [facility] [address] [phone]       [facility] Emergency Department Emergency Medicine Go to  As needed, If symptoms worsen [facility] [address] [phone] [facility] Emergency Department, [facility] [place], [place], [address]    [facility] Call  As needed [address] [facility]  [address]  [facility] [id number] [phone] [facility] [place], [address], [address] [facility] [facility], [place], [id number] [phone]    [facility] Call  As needed [address] [facility]  [address]  [facility] [address] [phone] [facility], [address] [facility], [address], [place], [place], [address] [phone]    [facility] [place] [facility] Call  As needed [address] [facility]  [address]  [facility] [address] [phone] [facility] [place], [address] [facility], [address], [place], [place], [address] [phone]            Discharge Medication List as of 3/16/2024  2:45 PM        START taking these medications    Details   methocarbamol (ROBAXIN) 500 mg tablet Take 1 tablet (500 mg total) by mouth 2 (two) times a day as needed for muscle spasms for up to 7 days, Starting Sat 3/16/2024, Until Sat 3/23/2024 at 2359, Normal      predniSONE 20 mg tablet Take 2 tablets (40 mg total) by mouth daily for 4 days, Starting Sat 3/16/2024, Until Wed 3/20/2024, Normal           CONTINUE these medications which have NOT CHANGED    Details   ARIPiprazole (ABILIFY) 5 mg tablet Take 1 tablet (5 mg total) by mouth daily, Starting Fri 3/8/2024, Normal      !! FLUoxetine (PROzac) 20 mg capsule Take 1 capsule (20 mg total) by mouth daily, Starting Fri 3/8/2024, Normal      !! FLUoxetine (PROzac) 40 MG capsule Take 1 capsule (40 mg total) by mouth daily, Starting Fri 3/8/2024, Normal       !! - Potential duplicate medications found. Please discuss with provider.            PDMP Review       None             ED Provider  Attending physically available and evaluated Maximiliano Perez. I managed the patient along with the ED Attending.    Electronically Signed by           Janee Cristina MD  03/17/24 8743

## 2024-03-16 NOTE — DISCHARGE INSTRUCTIONS
Please take Tylenol for pain every 4 hours as needed not to exceed 4000 mg or 4 g per day. For example you may take 500 mg every 4 hours or 1000 mg every 8 hours for pain.    Please take Ibuprofen as needed for pain, up to 3.2 g per day. For example you may take 600-800 mg every 6 hours alternating with Tylenol as needed.

## 2024-03-18 PROCEDURE — 93010 ELECTROCARDIOGRAM REPORT: CPT | Performed by: INTERNAL MEDICINE

## 2024-04-01 ENCOUNTER — OFFICE VISIT (OUTPATIENT)
Dept: OBGYN CLINIC | Facility: OTHER | Age: 49
End: 2024-04-01
Payer: COMMERCIAL

## 2024-04-01 VITALS
DIASTOLIC BLOOD PRESSURE: 80 MMHG | WEIGHT: 240 LBS | HEIGHT: 76 IN | HEART RATE: 76 BPM | SYSTOLIC BLOOD PRESSURE: 147 MMHG | BODY MASS INDEX: 29.22 KG/M2

## 2024-04-01 DIAGNOSIS — M75.22 BICEPS TENDINITIS OF LEFT SHOULDER: ICD-10-CM

## 2024-04-01 DIAGNOSIS — G56.22 ULNAR NEURITIS, LEFT: Primary | ICD-10-CM

## 2024-04-01 DIAGNOSIS — M25.512 LEFT SHOULDER PAIN, UNSPECIFIED CHRONICITY: ICD-10-CM

## 2024-04-01 PROCEDURE — 99204 OFFICE O/P NEW MOD 45 MIN: CPT | Performed by: ORTHOPAEDIC SURGERY

## 2024-04-01 NOTE — PROGRESS NOTES
Chief Complaint: Left shoulder pain and left hand tingling    HPI:    Maximiliano Perez is a 48year old Male who presents today for evaluation of left shoulder pain and left hand tingling      Description of symptoms: Patient describes intermittent gradual onset left shoulder pain of nearly 2 years duration without any preceding trauma.  Currently, notices that the pain is sharp mostly located in the anterior shoulder and made worse with lifting activities or overhead activities.  Denies any associated neck pain.  Over the last 2 months patient has also noticed some intermittent tingling and numbness of his left hand particularly involving the ulnar side.  He has noticed some occasional weakness of his left hand .  Does admit to sleeping on his left side with his elbow flexed which tends to worsen his symptoms in the left hand.      I have personally reviewed pertinent films in PACS and my interpretation is plain radiograph of the left shoulder performed on 3/16/2024 reveals left acromioclavicular joint moderate osteoarthritis.  No acute osseous degenerative change noted.  Minimal glenohumeral degenerative change.    Patient Active Problem List   Diagnosis    Anxiety    Gilbert's syndrome    Arthritis of left acromioclavicular joint    Bilateral carpal tunnel syndrome    Systolic murmur        Current Outpatient Medications on File Prior to Visit   Medication Sig Dispense Refill    FLUoxetine (PROzac) 20 mg capsule Take 1 capsule (20 mg total) by mouth daily 90 capsule 1    FLUoxetine (PROzac) 40 MG capsule Take 1 capsule (40 mg total) by mouth daily 90 capsule 1    ARIPiprazole (ABILIFY) 5 mg tablet Take 1 tablet (5 mg total) by mouth daily (Patient not taking: Reported on 4/1/2024) 90 tablet 1    methocarbamol (ROBAXIN) 500 mg tablet Take 1 tablet (500 mg total) by mouth 2 (two) times a day as needed for muscle spasms for up to 7 days 14 tablet 0     No current facility-administered medications on file prior to  visit.        No Known Allergies     Tobacco Use: Medium Risk (4/1/2024)    Patient History     Smoking Tobacco Use: Never     Smokeless Tobacco Use: Former     Passive Exposure: Not on file        Social Determinants of Health     Tobacco Use: Medium Risk (4/1/2024)    Patient History     Smoking Tobacco Use: Never     Smokeless Tobacco Use: Former     Passive Exposure: Not on file   Alcohol Use: Not on file   Financial Resource Strain: Not on file   Food Insecurity: Not on file   Transportation Needs: Not on file   Physical Activity: Not on file   Stress: Not on file   Social Connections: Not on file   Intimate Partner Violence: Not on file   Depression: Not at risk (12/1/2023)    PHQ-2     PHQ-2 Score: 0   Housing Stability: Not on file   Utilities: Not on file   Health Literacy: Not on file               Review of Systems     Body mass index is 29.34 kg/m².     Physical Exam  Vitals and nursing note reviewed.   Eyes:      Conjunctiva/sclera: Conjunctivae normal.   Cardiovascular:      Rate and Rhythm: Normal rate.      Pulses: Normal pulses.   Pulmonary:      Effort: Pulmonary effort is normal. No respiratory distress.   Neurological:      Mental Status: He is alert and oriented to person, place, and time.   Psychiatric:         Mood and Affect: Mood normal.         Behavior: Behavior normal.          Ortho Exam:    Body part: left shoulder    Inspection: No visible deformity    Palpation: Tender to palpation over the long head of the biceps in the bicipital groove.  Also has some mild tenderness over the acromioclavicular joint.    Range of motion: Active abduction is 130 degrees, forward flexion is 150 degrees, external rotation in adduction is 70 degrees and internal rotation is at the level of L2.    Special Tests: Negative empty can test.  Negative belly press test.  Mild discomfort with Dodd and Neer's.  No significant discomfort with acromioclavicular Kristy's compression test.  Positive Lajas's.  No  anterior prehension.  Left shoulder strength is 5/5 in all directions.        Body part: left elbow wrist and hand    Inspection: No visible deformity    Palpation: Mild discomfort over the cubital tunnel.    Range of motion: Full range of left elbow motion without any palpable ulnar nerve subluxation.    Special Tests: Negative carpal tunnel compression test of the left wrist.  Positive Tinel's over the left cubital tunnel.  Negative Phalen's sign.  Normal sensation to light touch in the left forearm and hand in all areas.  Good left hand  strength.  Grade 5/5 strength of left hand palmar and dorsal interossei as well as grade 5/5 strength of left thumb abduction and opposition.  Cervical spine exam does not reveal any significant midline tenderness.  Negative cervical axial load test and negative Spurling's maneuver bilaterally.      Procedures       Assessment:     Diagnosis ICD-10-CM Associated Orders   1. Ulnar neuritis, left  G56.22 Brace      2. Left shoulder pain, unspecified chronicity  M25.512 Diclofenac Sodium (VOLTAREN) 1 %      3. Biceps tendinitis of left shoulder  M75.22 Diclofenac Sodium (VOLTAREN) 1 %           Plan:    Explained my current clinical findings and reviewed the radiological findings with the patient today.  #1.  Left shoulder pain-his primary left anterior shoulder pain is likely secondary to longer the biceps tendinitis.  He also has some mild subacromial impingement and acromioclavicular arthrosis.  Patient is agreeable for ultrasound-guided left longer the biceps tendon sheath corticosteroid injection.  I did explain to him the risks and benefits of the injection including some increased risk of longer the biceps tendon injury.  He was explained that following his injection he should avoid doing heavy lifting pulling and pushing activity for at least 2 weeks subsequent to his injection.  We will accordingly schedule him for this procedure.    #2.  Left hand tingling and numbness  "-likely secondary to cubital tunnel syndrome and ulnar neuritis.  Counseled him with regards during sleeping position and will prescribe him a cubital brace to be applied to his left elbow while sleeping.  If symptoms persist beyond the next 4 to 6 weeks we will consider doing ultrasound assessment of the left cubital tunnel +/- EMG for further assessment.  At this time, I will prescribe him oral naproxen to be taken as needed.            Portions of the record may have been created with voice recognition software. Occasional wrong word or \"sound alike\" substitutions may have occurred due to the inherent limitations of voice recognition software. Please review the chart carefully and recognize, using context, where substitutions/typographical errors may have occurred.           "

## 2024-04-02 ENCOUNTER — TELEPHONE (OUTPATIENT)
Age: 49
End: 2024-04-02

## 2024-04-02 ENCOUNTER — TELEPHONE (OUTPATIENT)
Dept: OBGYN CLINIC | Facility: OTHER | Age: 49
End: 2024-04-02

## 2024-04-02 NOTE — TELEPHONE ENCOUNTER
Patient calling back to schedule USGI for Left shoulder.  He can be reached at the number below.    Call Back: [phone]

## 2024-04-02 NOTE — TELEPHONE ENCOUNTER
Left message for patient to call office back regarding scheduling an appointment for left shoulder ultrasound guided injection with Dr. Villegas.

## 2024-04-02 NOTE — TELEPHONE ENCOUNTER
04/02/24  Screened by: Francheska Baum    Referring Provider     Pre- Screening:     There is no height or weight on file to calculate BMI.  Has patient been referred for a routine screening Colonoscopy? yes  Is the patient between 45-75 years old? yes      Previous Colonoscopy no   If yes:    Date:     Facility:     Reason:     Does the patient want to see a Gastroenterologist prior to their procedure OR are they having any GI symptoms? no    Has the patient been hospitalized or had abdominal surgery in the past 6 months? no    Does the patient use supplemental oxygen? no    Does the patient take Coumadin, Lovenox, Plavix, Elliquis, Xarelto, or other blood thinning medication? no    Has the patient had a stroke, cardiac event, or stent placed in the past year? no    If patient is between 45yrs - 49yrs, please advise patient that we will have to confirm benefits & coverage with their insurance company for a routine screening colonoscopy.

## 2024-04-02 NOTE — TELEPHONE ENCOUNTER
Scheduled date of colonoscopy (as of today):05/24/2024  Physician performing colonoscopy:Dr Ward  Location of colonoscopy:[facility]  Bowel prep reviewed with patient:Miralax/dul  Instructions reviewed with patient by:sent via my chart   Clearances: n/a

## 2024-04-24 NOTE — PROGRESS NOTES
Hand/upper extremity injection  Universal Protocol:  Procedure performed by: (supervised by Dr. Villegas)  Consent: Verbal consent obtained.  Risks and benefits: risks, benefits and alternatives were discussed  Consent given by: patient  Patient understanding: patient states understanding of the procedure being performed  Patient consent: the patient's understanding of the procedure matches consent given  Site marked: the operative site was marked  Radiology Images displayed and confirmed. If images not available, report reviewed: imaging studies available  Required items: required blood products, implants, devices, and special equipment available  Patient identity confirmed: verbally with patient  Supporting Documentation  Indications: pain   Procedure Details  Condition:tendonitis Wrist location: left shoulder-long head of biceps tendon sheath.   Needle size: 22 G  Ultrasound guidance: yes  Approach: lateral  Medications administered: 40 mg triamcinolone acetonide 40 mg/mL; 1 mL ropivacaine 0.5 %  Patient tolerance: patient tolerated the procedure well with no immediate complications  Dressing:  Sterile dressing applied    Using iodine, alcohol and chlorhexadine to sterilize injection area. Sterile ultrasound gel and sterile glove for USGI. In line approach with linear transducer. Sterile bandage post injection.        Large joint arthrocentesis: R subacromial bursa  [place] Protocol:  Procedure performed by:  Consent: Verbal consent obtained.  Risks and benefits: risks, benefits and alternatives were discussed  Consent given by: patient  Patient understanding: patient states understanding of the procedure being performed  Patient consent: the patient's understanding of the procedure matches consent given  Site marked: the operative site was marked  Radiology Images displayed and confirmed. If images not available, report reviewed: imaging studies available  Patient identity confirmed: verbally with  patient  Supporting Documentation  Indications: pain   Procedure Details  Location: shoulder - R subacromial bursa  Needle size: 22 G  Ultrasound guidance: yes  Approach: lateral  Medications administered: 40 mg triamcinolone acetonide 40 mg/mL; 8 mL ropivacaine 0.5 %    Patient tolerance: patient tolerated the procedure well with no immediate complications  Dressing:  Sterile dressing applied    Using iodine, alcohol and chlorhexadine to sterilize injection area. Sterile ultrasound gel and sterile glove for USGI. In line approach with linear transducer. Sterile bandage post injection.

## 2024-04-26 ENCOUNTER — OFFICE VISIT (OUTPATIENT)
Dept: OBGYN CLINIC | Facility: OTHER | Age: 49
End: 2024-04-26

## 2024-04-26 VITALS
WEIGHT: 240.3 LBS | HEART RATE: 69 BPM | DIASTOLIC BLOOD PRESSURE: 76 MMHG | SYSTOLIC BLOOD PRESSURE: 119 MMHG | BODY MASS INDEX: 29.26 KG/M2 | HEIGHT: 76 IN

## 2024-04-26 DIAGNOSIS — M25.512 LEFT SHOULDER PAIN, UNSPECIFIED CHRONICITY: Primary | ICD-10-CM

## 2024-04-26 DIAGNOSIS — M75.22 BICEPS TENDINITIS OF LEFT SHOULDER: ICD-10-CM

## 2024-04-26 DIAGNOSIS — M75.42 IMPINGEMENT SYNDROME OF LEFT SHOULDER: ICD-10-CM

## 2024-04-26 RX ORDER — ROPIVACAINE HYDROCHLORIDE 5 MG/ML
1 INJECTION, SOLUTION EPIDURAL; INFILTRATION; PERINEURAL
Status: COMPLETED | OUTPATIENT
Start: 2024-04-26 | End: 2024-04-26

## 2024-04-26 RX ORDER — ROPIVACAINE HYDROCHLORIDE 5 MG/ML
8 INJECTION, SOLUTION EPIDURAL; INFILTRATION; PERINEURAL
Status: COMPLETED | OUTPATIENT
Start: 2024-04-26 | End: 2024-04-26

## 2024-04-26 RX ORDER — TRIAMCINOLONE ACETONIDE 40 MG/ML
40 INJECTION, SUSPENSION INTRA-ARTICULAR; INTRAMUSCULAR
Status: COMPLETED | OUTPATIENT
Start: 2024-04-26 | End: 2024-04-26

## 2024-04-26 RX ADMIN — ROPIVACAINE HYDROCHLORIDE 1 ML: 5 INJECTION, SOLUTION EPIDURAL; INFILTRATION; PERINEURAL at 07:30

## 2024-04-26 RX ADMIN — TRIAMCINOLONE ACETONIDE 40 MG: 40 INJECTION, SUSPENSION INTRA-ARTICULAR; INTRAMUSCULAR at 07:30

## 2024-04-26 RX ADMIN — ROPIVACAINE HYDROCHLORIDE 8 ML: 5 INJECTION, SOLUTION EPIDURAL; INFILTRATION; PERINEURAL at 07:30

## 2024-04-26 NOTE — PATIENT INSTRUCTIONS
red flags and risks of injection include but are not limited to infection <0.072% as referenced in some sources, nerve or artery penetration, and if steroids are used-skin dimpling <1%, hypo-pigmentation <1%. Recommended no submerging underwater in a tub, pool, ocean, lake, jacuzzi, hot tub, or any other body of water for 1 week until needle wound closes due to risk of infection. May take showers. Clean needle site with soap and water and keep covered at all times with sterile bandage such as a band-aid until fully healed. Educated if any symptoms including fevers, chills, swelling, or worsening symptoms occur then to call office or go to hospital for immediate care if physician unavailable due to possible infection or other complication which is a serious medical problem. Patient expressed understanding and agreed to proceed with procedure.

## 2024-05-10 ENCOUNTER — ANESTHESIA EVENT (OUTPATIENT)
Dept: ANESTHESIOLOGY | Facility: HOSPITAL | Age: 49
End: 2024-05-10

## 2024-05-10 ENCOUNTER — ANESTHESIA (OUTPATIENT)
Dept: ANESTHESIOLOGY | Facility: HOSPITAL | Age: 49
End: 2024-05-10

## 2024-05-13 ENCOUNTER — PATIENT MESSAGE (OUTPATIENT)
Dept: GASTROENTEROLOGY | Facility: AMBULARY SURGERY CENTER | Age: 49
End: 2024-05-13

## 2024-05-22 NOTE — PATIENT COMMUNICATION
Patient in regards to colonoscopy prep, advised of my chart message from 5/13 he stated he would go check in my chart and call back if further assistance is needed.

## 2024-05-23 RX ORDER — SODIUM CHLORIDE, SODIUM LACTATE, POTASSIUM CHLORIDE, CALCIUM CHLORIDE 600; 310; 30; 20 MG/100ML; MG/100ML; MG/100ML; MG/100ML
75 INJECTION, SOLUTION INTRAVENOUS CONTINUOUS
Status: CANCELLED | OUTPATIENT
Start: 2024-05-23

## 2024-05-24 ENCOUNTER — ANESTHESIA (OUTPATIENT)
Dept: GASTROENTEROLOGY | Facility: AMBULARY SURGERY CENTER | Age: 49
End: 2024-05-24

## 2024-05-24 ENCOUNTER — ANESTHESIA EVENT (OUTPATIENT)
Dept: GASTROENTEROLOGY | Facility: AMBULARY SURGERY CENTER | Age: 49
End: 2024-05-24

## 2024-05-24 ENCOUNTER — HOSPITAL ENCOUNTER (OUTPATIENT)
Dept: GASTROENTEROLOGY | Facility: AMBULARY SURGERY CENTER | Age: 49
Setting detail: OUTPATIENT SURGERY
End: 2024-05-24
Attending: INTERNAL MEDICINE
Payer: COMMERCIAL

## 2024-05-24 VITALS
OXYGEN SATURATION: 97 % | DIASTOLIC BLOOD PRESSURE: 72 MMHG | RESPIRATION RATE: 20 BRPM | HEART RATE: 54 BPM | TEMPERATURE: 97.1 F | BODY MASS INDEX: 27.4 KG/M2 | WEIGHT: 225 LBS | SYSTOLIC BLOOD PRESSURE: 116 MMHG | HEIGHT: 76 IN

## 2024-05-24 DIAGNOSIS — Z12.11 SCREENING FOR COLON CANCER: ICD-10-CM

## 2024-05-24 PROCEDURE — G0121 COLON CA SCRN NOT HI RSK IND: HCPCS | Performed by: INTERNAL MEDICINE

## 2024-05-24 RX ORDER — GLYCOPYRROLATE 0.2 MG/ML
INJECTION INTRAMUSCULAR; INTRAVENOUS AS NEEDED
Status: DISCONTINUED | OUTPATIENT
Start: 2024-05-24 | End: 2024-05-24

## 2024-05-24 RX ORDER — PROPOFOL 10 MG/ML
INJECTION, EMULSION INTRAVENOUS CONTINUOUS PRN
Status: DISCONTINUED | OUTPATIENT
Start: 2024-05-24 | End: 2024-05-24

## 2024-05-24 RX ORDER — PROPOFOL 10 MG/ML
INJECTION, EMULSION INTRAVENOUS AS NEEDED
Status: DISCONTINUED | OUTPATIENT
Start: 2024-05-24 | End: 2024-05-24

## 2024-05-24 RX ORDER — SODIUM CHLORIDE 9 MG/ML
INJECTION, SOLUTION INTRAVENOUS CONTINUOUS PRN
Status: DISCONTINUED | OUTPATIENT
Start: 2024-05-24 | End: 2024-05-24

## 2024-05-24 RX ORDER — LIDOCAINE HYDROCHLORIDE 10 MG/ML
INJECTION, SOLUTION EPIDURAL; INFILTRATION; INTRACAUDAL; PERINEURAL AS NEEDED
Status: DISCONTINUED | OUTPATIENT
Start: 2024-05-24 | End: 2024-05-24

## 2024-05-24 RX ADMIN — PROPOFOL 100 MCG/KG/MIN: 10 INJECTION, EMULSION INTRAVENOUS at 14:51

## 2024-05-24 RX ADMIN — GLYCOPYRROLATE 0.2 MCG: 0.2 INJECTION INTRAMUSCULAR; INTRAVENOUS at 14:59

## 2024-05-24 RX ADMIN — PROPOFOL 50 MG: 10 INJECTION, EMULSION INTRAVENOUS at 14:55

## 2024-05-24 RX ADMIN — PROPOFOL 30 MG: 10 INJECTION, EMULSION INTRAVENOUS at 15:01

## 2024-05-24 RX ADMIN — PROPOFOL 100 MG: 10 INJECTION, EMULSION INTRAVENOUS at 14:52

## 2024-05-24 RX ADMIN — SODIUM CHLORIDE: 0.9 INJECTION, SOLUTION INTRAVENOUS at 14:52

## 2024-05-24 RX ADMIN — LIDOCAINE HYDROCHLORIDE 50 MG: 10 INJECTION, SOLUTION EPIDURAL; INFILTRATION; INTRACAUDAL; PERINEURAL at 14:51

## 2024-05-24 RX ADMIN — Medication 40 MG: at 14:59

## 2024-05-24 NOTE — ANESTHESIA POSTPROCEDURE EVALUATION
Post-Op Assessment Note    CV Status:  Stable  Pain Score: 0    Pain management: adequate       Mental Status:  Alert and awake   Hydration Status:  Euvolemic   PONV Controlled:  Controlled   Airway Patency:  Patent     Post Op Vitals Reviewed: Yes    No anethesia notable event occurred.    Staff: CRNA               /61 (05/24/24 1513)    Temp (!) 97.1 °F (36.2 °C) (05/24/24 1513)    Pulse (!) 53 (05/24/24 1513)   Resp 12 (05/24/24 1513)    SpO2 97 % (05/24/24 1513)

## 2024-05-24 NOTE — H&P
"History and Physical -  [facility]  Maximiliano CRUZ Chris III 48 y.o. male MRN: [id number]        HPI: 48-year-old male was referred for screening colonoscopy.  Regular bowel movements.    Historical Information   Past Medical History:   Diagnosis Date    Anxiety     Conjunctivitis of left eye 2/21/2018    Puncture wound 9/6/2022     Past Surgical History:   Procedure Laterality Date    TONSILLECTOMY       Social History   Social History     Substance and Sexual Activity   Alcohol Use Not Currently    Comment: recently none     Social History     Substance and Sexual Activity   Drug Use No     Social History     Tobacco Use   Smoking Status Never   Smokeless Tobacco Former    Types: Chew   Tobacco Comments    Vaped 5/24/2024     Family History   Problem Relation Age of Onset    Hypertension Mother     Multiple sclerosis Father     No Known Problems Sister     Substance Abuse Brother     No Known Problems Brother     Lung cancer Maternal Grandmother     Hypertension Maternal Grandmother     Lung cancer Maternal Grandfather     COPD Maternal Grandfather     Colon cancer Maternal Grandfather     COPD Paternal Grandmother     No Known Problems Maternal Aunt     No Known Problems Maternal Uncle     No Known Problems Paternal Aunt     No Known Problems Paternal Uncle     Prostate cancer Neg Hx     Testicular cancer Neg Hx        Meds/Allergies     Not in a hospital admission.    No Known Allergies    Objective     Blood pressure 141/78, pulse 62, temperature (!) 96.4 °F (35.8 °C), temperature source Temporal, resp. rate 18, height 6' 4\" (1.93 m), weight 102 kg (225 lb), SpO2 96%.    Physical Exam:    Chest- CTA  Heart- RRR  Abdomen- NT/ND  Extremities- No edema    ASSESSMENT:     Screening for colon cancer    PLAN:    Colonoscopy              "

## 2024-05-24 NOTE — ANESTHESIA PREPROCEDURE EVALUATION
Procedure:  COLONOSCOPY    Relevant Problems   CARDIO   (+) Systolic murmur      MUSCULOSKELETAL   (+) Arthritis of left acromioclavicular joint      NEURO/PSYCH   (+) Anxiety      Other   (+) Gilbert's syndrome        Physical Exam    Airway    Mallampati score: II  TM Distance: >3 FB  Neck ROM: full     Dental       Cardiovascular  Rhythm: regular, Rate: normal    Pulmonary   Breath sounds clear to auscultation    Other Findings        Anesthesia Plan  ASA Score- 2     Anesthesia Type- IV sedation with anesthesia with ASA Monitors.         Additional Monitors:     Airway Plan:            Plan Factors-    Chart reviewed.        Patient is not a current smoker.              Induction- intravenous.    Postoperative Plan-     Perioperative Resuscitation Plan - Level 1 - Full Code.       Informed Consent- Anesthetic plan and risks discussed with patient.  I personally reviewed this patient with the CRNA. Discussed and agreed on the Anesthesia Plan with the CRNA..

## 2024-05-28 PROBLEM — Z98.890 HISTORY OF COLONOSCOPY: Status: ACTIVE | Noted: 2024-05-28

## 2024-09-06 DIAGNOSIS — F10.10 ALCOHOL ABUSE: ICD-10-CM

## 2024-09-06 DIAGNOSIS — F41.9 ANXIETY: ICD-10-CM

## 2024-09-06 RX ORDER — FLUOXETINE 40 MG/1
40 CAPSULE ORAL DAILY
Qty: 30 CAPSULE | Refills: 0 | Status: SHIPPED | OUTPATIENT
Start: 2024-09-06

## 2024-10-07 DIAGNOSIS — F10.10 ALCOHOL ABUSE: ICD-10-CM

## 2024-10-07 DIAGNOSIS — F41.9 ANXIETY: ICD-10-CM

## 2024-10-09 RX ORDER — FLUOXETINE 40 MG/1
40 CAPSULE ORAL DAILY
Qty: 90 CAPSULE | Refills: 0 | Status: SHIPPED | OUTPATIENT
Start: 2024-10-09

## 2024-10-09 NOTE — TELEPHONE ENCOUNTER
Patient followed up after change in medication.  Would like to see in 3 months to ensure continued adequate treatment and sobriety     DO Aura Cali [facility]  10/9/2024 7:51 AM

## 2024-10-10 DIAGNOSIS — F41.9 ANXIETY: ICD-10-CM

## 2024-10-10 DIAGNOSIS — F10.10 ALCOHOL ABUSE: ICD-10-CM

## 2024-10-10 RX ORDER — ARIPIPRAZOLE 5 MG/1
5 TABLET ORAL DAILY
Qty: 60 TABLET | Refills: 0 | Status: SHIPPED | OUTPATIENT
Start: 2024-10-10

## 2024-10-16 ENCOUNTER — TELEPHONE (OUTPATIENT)
Dept: FAMILY MEDICINE CLINIC | Facility: CLINIC | Age: 49
End: 2024-10-16

## 2024-10-16 NOTE — TELEPHONE ENCOUNTER
I left a message to schedule an appointment with PCP for follow up and medication management. Patient has labs that are overdue for PCP that require fasting.

## 2024-11-04 ENCOUNTER — TELEPHONE (OUTPATIENT)
Dept: FAMILY MEDICINE CLINIC | Facility: CLINIC | Age: 49
End: 2024-11-04

## 2024-11-08 ENCOUNTER — TELEPHONE (OUTPATIENT)
Dept: FAMILY MEDICINE CLINIC | Facility: CLINIC | Age: 49
End: 2024-11-08

## 2024-12-11 DIAGNOSIS — F10.10 ALCOHOL ABUSE: ICD-10-CM

## 2024-12-11 DIAGNOSIS — F41.9 ANXIETY: ICD-10-CM

## 2024-12-12 NOTE — TELEPHONE ENCOUNTER
[facility] to verify if patient is requesting this refill. Last filled in Oct per our records. Is patient taking the medication?

## 2024-12-15 RX ORDER — ARIPIPRAZOLE 5 MG/1
5 TABLET ORAL DAILY
Qty: 90 TABLET | Refills: 0 | Status: SHIPPED | OUTPATIENT
Start: 2024-12-15

## 2025-01-14 DIAGNOSIS — F10.10 ALCOHOL ABUSE: ICD-10-CM

## 2025-01-14 DIAGNOSIS — F41.9 ANXIETY: ICD-10-CM

## 2025-01-14 RX ORDER — FLUOXETINE 40 MG/1
40 CAPSULE ORAL DAILY
Qty: 30 CAPSULE | Refills: 0 | Status: SHIPPED | OUTPATIENT
Start: 2025-01-14 | End: 2025-01-24 | Stop reason: SDUPTHER

## 2025-01-24 ENCOUNTER — OFFICE VISIT (OUTPATIENT)
Dept: FAMILY MEDICINE CLINIC | Facility: CLINIC | Age: 50
End: 2025-01-24
Payer: COMMERCIAL

## 2025-01-24 VITALS
BODY MASS INDEX: 28.25 KG/M2 | HEART RATE: 73 BPM | OXYGEN SATURATION: 95 % | HEIGHT: 76 IN | WEIGHT: 232 LBS | DIASTOLIC BLOOD PRESSURE: 70 MMHG | RESPIRATION RATE: 16 BRPM | SYSTOLIC BLOOD PRESSURE: 120 MMHG

## 2025-01-24 DIAGNOSIS — Z00.00 ENCOUNTER FOR PREVENTIVE CARE: ICD-10-CM

## 2025-01-24 DIAGNOSIS — F10.11 HISTORY OF ALCOHOL ABUSE: ICD-10-CM

## 2025-01-24 DIAGNOSIS — Z12.5 SCREENING FOR MALIGNANT NEOPLASM OF PROSTATE: ICD-10-CM

## 2025-01-24 DIAGNOSIS — F10.21 HISTORY OF ALCOHOL DEPENDENCE (HCC): ICD-10-CM

## 2025-01-24 DIAGNOSIS — Z13.1 SCREENING FOR DIABETES MELLITUS: ICD-10-CM

## 2025-01-24 DIAGNOSIS — Z13.0 SCREENING FOR DEFICIENCY ANEMIA: ICD-10-CM

## 2025-01-24 DIAGNOSIS — M19.012 ARTHRITIS OF LEFT ACROMIOCLAVICULAR JOINT: Primary | ICD-10-CM

## 2025-01-24 DIAGNOSIS — F41.9 ANXIETY: ICD-10-CM

## 2025-01-24 DIAGNOSIS — Z13.220 SCREENING CHOLESTEROL LEVEL: ICD-10-CM

## 2025-01-24 PROBLEM — G56.22 ULNAR NEURITIS, LEFT: Status: RESOLVED | Noted: 2024-04-01 | Resolved: 2025-01-24

## 2025-01-24 PROBLEM — F10.20 ALCOHOL DEPENDENCE, UNCOMPLICATED (HCC): Status: ACTIVE | Noted: 2025-01-24

## 2025-01-24 PROCEDURE — 99396 PREV VISIT EST AGE 40-64: CPT | Performed by: FAMILY MEDICINE

## 2025-01-24 RX ORDER — FLUOXETINE 40 MG/1
40 CAPSULE ORAL DAILY
Qty: 90 CAPSULE | Refills: 3 | Status: SHIPPED | OUTPATIENT
Start: 2025-01-24

## 2025-01-24 NOTE — ASSESSMENT & PLAN NOTE
Patient had a history of anxiety and alcohol abuse.  Thankfully he is doing well on Prozac 60 mg daily.  No need for dosage change at this time, patient stable.  He has remained sober for 1 year.  Congratulations was given due to this excess and I hope this is to continue for him.  I did review that if anxiety spikes or if he feels that he may return to prior habits, I recommend that he come into the office so we can discuss either restarting Abilify or considering naltrexone.  Orders:    FLUoxetine (PROzac) 20 mg capsule; Take 1 capsule (20 mg total) by mouth daily    FLUoxetine (PROzac) 40 MG capsule; Take 1 capsule (40 mg total) by mouth daily

## 2025-01-24 NOTE — PROGRESS NOTES
Adult Annual Physical  Name: Maximiliano Perez III      : 1975      MRN: [id number]  Encounter Provider: Nader Plascencia DO  Encounter Date: 2025   Encounter department: [facility]    Assessment & Plan  Arthritis of left acromioclavicular joint  Patient received intra articular injections about 1 year ago.  This did help to improve range of motion and the neurologic/neuropathic pain in the hand.  He continues to have some pain and discomfort in the pectoral muscle and certain motions.  Patient to follow-up if the joint is giving him more issues or problems.         Anxiety  Patient had a history of anxiety and alcohol abuse.  Thankfully he is doing well on Prozac 60 mg daily.  No need for dosage change at this time, patient stable.  He has remained sober for 1 year.  Congratulations was given due to this excess and I hope this is to continue for him.  I did review that if anxiety spikes or if he feels that he may return to prior habits, I recommend that he come into the office so we can discuss either restarting Abilify or considering naltrexone.  Orders:    FLUoxetine (PROzac) 20 mg capsule; Take 1 capsule (20 mg total) by mouth daily    FLUoxetine (PROzac) 40 MG capsule; Take 1 capsule (40 mg total) by mouth daily    History of alcohol abuse  See anxiety  Orders:    FLUoxetine (PROzac) 20 mg capsule; Take 1 capsule (20 mg total) by mouth daily    FLUoxetine (PROzac) 40 MG capsule; Take 1 capsule (40 mg total) by mouth daily    Comprehensive metabolic panel; Future    CBC and differential; Future    History of alcohol dependence (HCC)  See anxiety  Orders:    Comprehensive metabolic panel; Future    CBC and differential; Future    Encounter for preventive care  Patient presents today for preventative care/annual physical.  Will evaluate for underlying sugar issues, electrolytes, liver function, kidney function, cholesterol.  Additionally I reviewed the possible issues with  the prostate as he gets older.  He would like to do a baseline at this time since I usually do it around 52 or once symptoms start.  Orders:    Comprehensive metabolic panel; Future    PSA, Total Screen; Future    CBC and differential; Future    Lipid panel; Future    Screening for diabetes mellitus  Will screen for diabetes, CMP ordered for fasting sugar  Orders:    Comprehensive metabolic panel; Future    Screening for malignant neoplasm of prostate  Will screen for prostate cancer, PSA ordered  Orders:    PSA, Total Screen; Future    Screening for deficiency anemia  We will screen for anemia, CBC ordered  Orders:    CBC and differential; Future    Screening cholesterol level  Patient had a history of severe increase to triglycerides in the past.  This was likely due to vacationing before labs were performed.  Will follow-up with lipid panel for cholesterol evaluation.  Orders:    Lipid panel; Future    Immunizations and preventive care screenings were discussed with patient today. Appropriate education was printed on patient's after visit summary.    Discussed risks and benefits of prostate cancer screening. We discussed the controversial history of PSA screening for prostate cancer in the [place] as well as the risk of over detection and over treatment of prostate cancer by way of PSA screening.  The patient understands that PSA blood testing is an imperfect way to screen for prostate cancer and that elevated PSA levels in the blood may also be caused by infection, inflammation, prostatic trauma or manipulation, urological procedures, or by benign prostatic enlargement.    The role of the digital rectal examination in prostate cancer screening was also discussed and I discussed with him that there is large interobserver variability in the findings of digital rectal examination.    Counseling:  Alcohol/drug use: discussed moderation in alcohol intake, the recommendations for healthy alcohol use, and  avoidance of illicit drug use.  Dental Health: discussed importance of regular tooth brushing, flossing, and dental visits.  Sexual health: discussed sexually transmitted diseases, partner selection, use of condoms, avoidance of unintended pregnancy, and contraceptive alternatives.  Exercise: the importance of regular exercise/physical activity was discussed. Recommend exercise 3-5 times per week for at least 30 minutes.       Depression Screening and Follow-up Plan: Patient was screened for depression during today's encounter. They screened negative with a PHQ-2 score of 0.        History of Present Illness     Adult Annual Physical:  Patient presents for annual physical. Patient presents today for annual physical.  Thankfully he has remained sober for over a year now.  He has been on appropriate medication and initially was on Abilify and Prozac, thankfully he has been able to taper off of the Abilify and just remain on the 60 mg of Prozac with good results.  Overall he remains active as a , and is eating and drinking appropriately..     Diet and Physical Activity:  - Diet/Nutrition: well balanced diet.  - Exercise: walking. work- construction    Depression Screening:  - PHQ-2 Score: 0    General Health:  - Sleep:. 6-7 hours a night, not hard to get sleep  - Hearing: normal hearing bilateral ears.  - Vision: wears contacts and goes for regular eye exams.  - Dental: brushes teeth twice daily and regular dental visits.     Health:  - History of STDs: no.   - Urinary symptoms: none.     Review of Systems   Constitutional:  Negative for chills, fever and unexpected weight change.   HENT:  Positive for postnasal drip (during winter months common) and rhinorrhea (during winter months common). Negative for congestion, ear discharge, ear pain, hearing loss, sinus pressure, sinus pain and sore throat.    Eyes:  Positive for visual disturbance (wears contacts).   Respiratory:  Negative for cough, chest  "tightness and shortness of breath.    Cardiovascular:  Negative for chest pain and palpitations.   Gastrointestinal:  Negative for abdominal pain, blood in stool, constipation, diarrhea, nausea and vomiting.   Genitourinary:  Negative for dysuria.   Skin:  Negative for rash and wound.   Neurological:  Negative for dizziness, light-headedness and headaches.   Psychiatric/Behavioral:  Negative for self-injury and suicidal ideas.      Medical History Reviewed by provider this encounter:  Allergies  Meds  Problems  Med Hx  Surg Hx  Fam Hx     .  Current Outpatient Medications on File Prior to Visit   Medication Sig Dispense Refill    FLUoxetine (PROzac) 20 mg capsule TAKE 1 CAPSULE BY MOUTH EVERY DAY 90 capsule 1    FLUoxetine (PROzac) 40 MG capsule TAKE 1 CAPSULE (40 MG TOTAL) BY MOUTH DAILY. 30 capsule 0    methocarbamol (ROBAXIN) 500 mg tablet Take 1 tablet (500 mg total) by mouth 2 (two) times a day as needed for muscle spasms for up to 7 days 14 tablet 0    [DISCONTINUED] ARIPiprazole (ABILIFY) 5 mg tablet TAKE 1 TABLET (5 MG TOTAL) BY MOUTH DAILY. (Patient not taking: Reported on 1/24/2025) 90 tablet 0    [DISCONTINUED] Diclofenac Sodium (VOLTAREN) 1 % Apply 2 g topically 3 (three) times a day as needed (for left shoulder pain) (Patient not taking: Reported on 1/24/2025) 50 g 1     No current facility-administered medications on file prior to visit.      Social History     Tobacco Use    Smoking status: Never    Smokeless tobacco: Former     Types: Chew    Tobacco comments:     Vaped 5/24/2024   Vaping Use    Vaping status: Never Used   Substance and Sexual Activity    Alcohol use: Not Currently     Comment: recently none    Drug use: No    Sexual activity: Yes     Partners: Female     Birth control/protection: OCP       Objective   /70   Pulse 73   Resp 16   Ht 6' 4\" (1.93 m)   Wt 105 kg (232 lb)   SpO2 95%   BMI 28.24 kg/m²     Physical Exam  Constitutional:       General: He is not in acute " distress.     Appearance: Normal appearance. He is not ill-appearing, toxic-appearing or diaphoretic.   HENT:      Head: Normocephalic and atraumatic.      Right Ear: Tympanic membrane, ear canal and external ear normal. There is no impacted cerumen.      Left Ear: Tympanic membrane, ear canal and external ear normal. There is no impacted cerumen.      Nose: Nose normal. No congestion or rhinorrhea.      Mouth/Throat:      Mouth: Mucous membranes are moist.      Pharynx: Oropharynx is clear. No oropharyngeal exudate or posterior oropharyngeal erythema.   Eyes:      General:         Right eye: No discharge.         Left eye: No discharge.      Extraocular Movements: Extraocular movements intact.      Pupils: Pupils are equal, round, and reactive to light.   Cardiovascular:      Rate and Rhythm: Normal rate and regular rhythm.      Heart sounds: Normal heart sounds. No murmur heard.     No friction rub. No gallop.   Pulmonary:      Effort: Pulmonary effort is normal. No respiratory distress.      Breath sounds: Normal breath sounds. No stridor. No wheezing, rhonchi or rales.   Abdominal:      General: Bowel sounds are normal. There is no distension.      Palpations: Abdomen is soft.      Tenderness: There is no abdominal tenderness.   Musculoskeletal:      Cervical back: No tenderness.   Lymphadenopathy:      Cervical: No cervical adenopathy.   Skin:     General: Skin is warm.      Capillary Refill: Capillary refill takes less than 2 seconds.   Neurological:      Mental Status: He is alert.      Motor: No weakness (5/5 in all 4 extremities).      Deep Tendon Reflexes: Reflexes normal (2/4, intact, C5, C6, L4, S1).   Psychiatric:      Comments: Patient in good spirits

## 2025-01-24 NOTE — ASSESSMENT & PLAN NOTE
Patient received intra articular injections about 1 year ago.  This did help to improve range of motion and the neurologic/neuropathic pain in the hand.  He continues to have some pain and discomfort in the pectoral muscle and certain motions.  Patient to follow-up if the joint is giving him more issues or problems.